# Patient Record
Sex: FEMALE | Race: WHITE | NOT HISPANIC OR LATINO | Employment: UNEMPLOYED | URBAN - NONMETROPOLITAN AREA
[De-identification: names, ages, dates, MRNs, and addresses within clinical notes are randomized per-mention and may not be internally consistent; named-entity substitution may affect disease eponyms.]

---

## 2017-01-24 ENCOUNTER — OFFICE VISIT (OUTPATIENT)
Dept: CARDIOLOGY | Facility: CLINIC | Age: 77
End: 2017-01-24

## 2017-01-24 VITALS
WEIGHT: 208.2 LBS | BODY MASS INDEX: 35.55 KG/M2 | DIASTOLIC BLOOD PRESSURE: 68 MMHG | OXYGEN SATURATION: 94 % | HEART RATE: 88 BPM | SYSTOLIC BLOOD PRESSURE: 144 MMHG | HEIGHT: 64 IN

## 2017-01-24 DIAGNOSIS — I35.0 NONRHEUMATIC AORTIC VALVE STENOSIS: ICD-10-CM

## 2017-01-24 DIAGNOSIS — I10 ESSENTIAL HYPERTENSION: ICD-10-CM

## 2017-01-24 DIAGNOSIS — I48.0 PAROXYSMAL A-FIB (HCC): Primary | ICD-10-CM

## 2017-01-24 DIAGNOSIS — Z79.01 CHRONIC ANTICOAGULATION: ICD-10-CM

## 2017-01-24 DIAGNOSIS — E78.2 MIXED HYPERLIPIDEMIA: ICD-10-CM

## 2017-01-24 DIAGNOSIS — I48.0 PAROXYSMAL ATRIAL FIBRILLATION (HCC): ICD-10-CM

## 2017-01-24 PROCEDURE — 93000 ELECTROCARDIOGRAM COMPLETE: CPT | Performed by: INTERNAL MEDICINE

## 2017-01-24 PROCEDURE — 99213 OFFICE O/P EST LOW 20 MIN: CPT | Performed by: INTERNAL MEDICINE

## 2017-01-24 RX ORDER — DOCUSATE SODIUM 100 MG/1
100 CAPSULE, LIQUID FILLED ORAL 2 TIMES DAILY PRN
COMMUNITY
End: 2017-04-11 | Stop reason: SDDI

## 2017-01-24 RX ORDER — ALBUTEROL SULFATE 90 UG/1
2 AEROSOL, METERED RESPIRATORY (INHALATION) EVERY 4 HOURS PRN
COMMUNITY

## 2017-01-24 NOTE — MR AVS SNAPSHOT
Zenia Olivares   1/24/2017 2:00 PM   Office Visit    Dept Phone:  [phone]   Encounter #:  [id number]    Provider:  Berta Leahy MD   Department:  [facility] CARDIOLOGY                Your Full Care Plan              Today's Medication Changes          These changes are accurate as of: 1/24/17  3:01 PM.  If you have any questions, ask your nurse or doctor.               Stop taking medication(s)listed here:     lisinopril 10 MG tablet   Commonly known as:  PRINIVIL,ZESTRIL   Stopped by:  Berta Leahy MD                      Your Updated Medication List          This list is accurate as of: 1/24/17  3:01 PM.  Always use your most recent med list.                albuterol 108 (90 BASE) MCG/ACT inhaler   Commonly known as:  PROVENTIL HFA;VENTOLIN HFA       apixaban 5 MG tablet tablet   Commonly known as:  ELIQUIS       baclofen 10 MG tablet   Commonly known as:  LIORESAL       busPIRone 5 MG tablet   Commonly known as:  BUSPAR       chlorthalidone 25 MG tablet   Commonly known as:  HYGROTON       cholecalciferol 1000 UNITS tablet   Commonly known as:  VITAMIN D3       CloNIDine 0.2 MG tablet   Commonly known as:  CATAPRES       ferrous sulfate 325 (65 FE) MG tablet       FLUTICASONE PROPIONATE NA       gabapentin 600 MG tablet   Commonly known as:  NEURONTIN       hydrALAZINE 100 MG tablet   Commonly known as:  APRESOLINE       insulin aspart 100 UNIT/ML injection   Commonly known as:  novoLOG       insulin detemir 100 UNIT/ML injection   Commonly known as:  LEVEMIR       levothyroxine 125 MCG tablet   Commonly known as:  SYNTHROID, LEVOTHROID       LINZESS 145 MCG capsule   Generic drug:  Linaclotide       loratadine 10 MG tablet   Commonly known as:  CLARITIN       montelukast 10 MG tablet   Commonly known as:  SINGULAIR       omeprazole 20 MG capsule   Commonly known as:  priLOSEC       rosuvastatin 10 MG tablet   Commonly known as:  CRESTOR       "STOOL SOFTENER 100 MG capsule   Generic drug:  docusate sodium       vitamin B-12 500 MCG tablet   Commonly known as:  CYANOCOBALAMIN               We Performed the Following     ECG 12 Lead       You Were Diagnosed With        Codes Comments    Paroxysmal a-fib    -  Primary ICD-10-CM: I48.0  ICD-9-CM: 427.31     Paroxysmal atrial fibrillation     ICD-10-CM: I48.0  ICD-9-CM: 427.31     Nonrheumatic aortic valve stenosis     ICD-10-CM: I35.0  ICD-9-CM: 424.1     Essential hypertension     ICD-10-CM: I10  ICD-9-CM: 401.9     Mixed hyperlipidemia     ICD-10-CM: E78.2  ICD-9-CM: 272.2     Chronic anticoagulation     ICD-10-CM: Z79.01  ICD-9-CM: V58.61       Instructions     None    Patient Instructions History      Upcoming Appointments     Visit Type Date Time Department    FOLLOW UP 1/24/2017  2:00 PM [facility] [place]    FOLLOW UP 4/11/2017  2:30 PM [facility] [place]      MyChart Signup     Our records indicate that you have declined [facility] [employer] signup. If you would like to sign up for [employer], please email [email or web address] or call [phone] to obtain an activation code.             Other Info from Your Visit           Your Appointments     Apr 11, 2017  2:30 PM EDT   Follow Up with Berta Leahy MD   [facility] CARDIOLOGY (--)    [address]  [place] [address] [phone]           Arrive 15 minutes prior to appointment.              Allergies     Ibuprofen      Sulfa Antibiotics        Reason for Visit     Atrial Fibrillation     Hypertension     Hyperlipidemia           Vital Signs     Blood Pressure Pulse Height Weight Oxygen Saturation Body Mass Index    144/68 (BP Location: Left arm, Patient Position: Sitting) 88 64\" (162.6 cm) 208 lb 3.2 oz (94.4 kg) 94% 35.74 kg/m2    Smoking Status                   Former Smoker           Problems and Diagnoses Noted     Long term use of blood thinners    High cholesterol or triglycerides    High blood " pressure    Aortic heart valve narrowing    Atrial fibrillation (irregular heartbeat)    Atrial fibrillation (irregular heartbeat)    -  Primary      No Longer an Issue     Kidney failure

## 2017-01-24 NOTE — PROGRESS NOTES
subjective     Chief Complaint   Patient presents with   • Atrial Fibrillation   • Hypertension   • Hyperlipidemia     History of Present Illness    Patient is here for cardiology follow-up for multiple cardiac issues.  Paroxysmal atrial fibrillation  Patient has paroxysmal atrial fibrillation she was admitted to [facility] May 25, 2016 where she was in atrial fibrillation and converted to sinus rhythm. Cardiac workup was negative stress test was negative for ischemia. LV ejection fraction is normal however patient had left ventricle hypertrophy, LV diastolic dysfunction and severe pulmonary hypertension. RV systolic pressure was 60.  Patient is maintained in sinus rhythm without any antiarrhythmic therapy.  In 2012 patient had a ASHLYN which showed mild left frontal hypertrophy and diastolic dysfunction trace mitral and tricuspid regurgitation.     Chronic anticoagulation  Chads VASC score is around 5 which is quite high  Patient is taking eliquis 5 mg twice a day. She has had no bleeding problems no side effects she is tolerating it very well     Hyperlipidemia  Patient is on Crestor 10 mg daily she is tolerating it very well. She states that she had lab work with Dr. Sena and lipids have been apparently acceptable.        HYPERTENSION  Zenia Olivares has long-standing essential hypertension for years. she is taking medications regularly. There are no medication side effects. Blood pressure is very well controlled. There has been no headache nausea chest pain. There has been no syncopal or presyncopal episode. she denies episodes of hypo-tension or accelerated hypertension.     Patient has renal failure however last creatinine is only 1.04 with BUN of 25. GFR is 52. Overall she is doing significantly better.     Patient also has hypothyroidism and diabetes mellitus.  Patient Active Problem List   Diagnosis   • Hypertension*   • Paroxysmal atrial fibrillation   • Asthma*   • Iron deficiency*   • Hypothyroidism*    • Diabetes mellitus*   • Arthritis*   • Chronic anticoagulation*   • Hyperlipidemia*   • Nonrheumatic aortic valve stenosis   • Pulmonary hypertension       Social History   Substance Use Topics   • Smoking status: Former Smoker     Quit date: 5/23/1966   • Smokeless tobacco: Never Used   • Alcohol use No       Allergies   Allergen Reactions   • Ibuprofen    • Sulfa Antibiotics          Current Outpatient Prescriptions:   •  albuterol (PROVENTIL HFA;VENTOLIN HFA) 108 (90 BASE) MCG/ACT inhaler, Inhale 2 puffs Every 4 (Four) Hours As Needed for wheezing., Disp: , Rfl:   •  apixaban (ELIQUIS) 5 MG tablet tablet, Take 5 mg by mouth 2 (two) times a day., Disp: , Rfl:   •  baclofen (LIORESAL) 10 MG tablet, Take 10 mg by mouth daily., Disp: , Rfl:   •  busPIRone (BUSPAR) 5 MG tablet, Take 5 mg by mouth 3 (three) times a day., Disp: , Rfl:   •  chlorthalidone (HYGROTEN) 25 MG tablet, Take 25 mg by mouth daily., Disp: , Rfl:   •  cholecalciferol (VITAMIN D3) 1000 UNITS tablet, Take 1,000 Units by mouth daily., Disp: , Rfl:   •  cloNIDine (CATAPRES) 0.2 MG tablet, Take 0.2 mg by mouth 3 (three) times a day as needed for high blood pressure., Disp: , Rfl:   •  docusate sodium (STOOL SOFTENER) 100 MG capsule, Take 100 mg by mouth 2 (Two) Times a Day As Needed for constipation., Disp: , Rfl:   •  ferrous sulfate 325 (65 FE) MG tablet, Take 325 mg by mouth daily with breakfast., Disp: , Rfl:   •  FLUTICASONE PROPIONATE NA, into each nostril., Disp: , Rfl:   •  gabapentin (NEURONTIN) 600 MG tablet, Take 600 mg by mouth 2 (Two) Times a Day., Disp: , Rfl:   •  hydrALAZINE (APRESOLINE) 100 MG tablet, Take 100 mg by mouth 3 (three) times a day. 1/2 tablet three times daily, Disp: , Rfl:   •  insulin aspart (NovoLOG) 100 UNIT/ML injection, Inject  under the skin 3 (three) times a day before meals. 4 units twice daily as needed, Disp: , Rfl:   •  insulin detemir (LEVEMIR) 100 UNIT/ML injection, Inject  under the skin daily. 20 units  "in the morning, 30 units in the evening., Disp: , Rfl:   •  levothyroxine (SYNTHROID, LEVOTHROID) 125 MCG tablet, Take 125 mcg by mouth daily., Disp: , Rfl:   •  Linaclotide (LINZESS) 145 MCG capsule, Take 145 capsules by mouth daily., Disp: , Rfl:   •  loratadine (CLARITIN) 10 MG tablet, Take 10 mg by mouth daily., Disp: , Rfl:   •  montelukast (SINGULAIR) 10 MG tablet, Take 10 mg by mouth every night., Disp: , Rfl:   •  omeprazole (PriLOSEC) 20 MG capsule, Take 20 mg by mouth 2 (two) times a day., Disp: , Rfl:   •  rosuvastatin (CRESTOR) 10 MG tablet, Take 10 mg by mouth daily., Disp: , Rfl:   •  vitamin B-12 (CYANOCOBALAMIN) 500 MCG tablet, Take 500 mcg by mouth daily., Disp: , Rfl:       The following portions of the patient's history were reviewed and updated as appropriate: allergies, current medications, past family history, past medical history, past social history, past surgical history and problem list.    Review of Systems   Constitution: Positive for weakness and malaise/fatigue. Negative for chills, decreased appetite and fever.   HENT: Negative.    Eyes: Negative.    Cardiovascular: Negative.  Negative for chest pain, dyspnea on exertion, irregular heartbeat, leg swelling, near-syncope, orthopnea and syncope.   Respiratory: Negative.    Hematologic/Lymphatic: Negative.    Skin: Negative.    Musculoskeletal: Negative.    Gastrointestinal: Negative.           Objective:     Visit Vitals   • /68 (BP Location: Left arm, Patient Position: Sitting)   • Pulse 88   • Ht 64\" (162.6 cm)   • Wt 208 lb 3.2 oz (94.4 kg)   • SpO2 94%   • BMI 35.74 kg/m2     Physical Exam   Constitutional: She appears well-developed and well-nourished.   HENT:   Head: Normocephalic and atraumatic.   Mouth/Throat: Oropharynx is clear and moist.   Eyes: Conjunctivae and EOM are normal. Pupils are equal, round, and reactive to light. No scleral icterus.   Neck: Normal range of motion. Neck supple. No JVD present. No tracheal " deviation present. No thyromegaly present.   Cardiovascular: Normal rate, regular rhythm, normal heart sounds and intact distal pulses.  Exam reveals no friction rub.    No murmur heard.  Pulmonary/Chest: Effort normal and breath sounds normal. No respiratory distress. She has no wheezes. She has no rales. She exhibits no tenderness.   Abdominal: Soft. Bowel sounds are normal. She exhibits no distension and no mass. There is no tenderness. There is no rebound and no guarding.   Musculoskeletal: Normal range of motion. She exhibits no edema, tenderness or deformity.   Lymphadenopathy:     She has no cervical adenopathy.   Neurological: She is alert. She has normal reflexes. No cranial nerve deficit. She exhibits normal muscle tone. Coordination normal.   Skin: Skin is warm and dry.   Psychiatric: She has a normal mood and affect. Her behavior is normal. Judgment and thought content normal.         Lab Review  Lab Results   Component Value Date     12/28/2016    K 4.3 12/28/2016     12/28/2016    BUN 16 12/28/2016    CREATININE 1.08 12/28/2016    GLUCOSE 150 (H) 12/28/2016    CALCIUM 9.7 12/28/2016    ALT 14 12/28/2016    ALKPHOS 96 12/28/2016    LABIL2 1.6 12/28/2016     Lab Results   Component Value Date    CKTOTAL 108 03/24/2016     Lab Results   Component Value Date    WBC 7.3 03/24/2016    HGB 12.8 03/24/2016    HCT 40.1 03/24/2016     03/24/2016     No results found for: INR  No results found for: MG  Lab Results   Component Value Date    TSH 0.612 03/30/2015     No results found for: BNP  Lab Results   Component Value Date    CHLPL 97 03/24/2016    LDLP 406 03/30/2015     Lab Results   Component Value Date    TRIG 73 03/24/2016    HDL 49 (L) 03/24/2016    VLDL 15 03/24/2016           ECG 12 Lead  Date/Time: 2/6/2017 9:46 AM  Performed by: ROLLY DE LA TORRE  Authorized by: ROLLY DE LA TORRE   Rhythm: sinus rhythm  Rate: normal  ST Segments: ST segments normal  QRS axis:  normal  Clinical impression: abnormal ECG  Comments: Sinus rhythm, nonspecific ST and T wave changes, 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 Paroxysmal a-fib    2. Paroxysmal atrial fibrillation currently in sinus rhythm*    3. Nonrheumatic aortic valve stenosis    4. Essential hypertension    5. Mixed hyperlipidemia    6. Chronic anticoagulation*          Plan:      Patient has paroxysmal atrial fibrillation currently appears to be in sinus rhythm and is doing very well.  Rate is controlled also.  As she is chronically anticoagulated with no side effects no bleeding.  Patient states that she had lab work done at her PCP will get a copy.  Weight loss was stressed.  No change in therapy was made.        Return in about 3 months (around 4/24/2017).

## 2017-01-24 NOTE — LETTER
January 28, 2017     Farhad Sena MD  [address]  [place]  [place] [address]    Patient: Zenia Olivares   YOB: 1940   Date of Visit: 1/24/2017       Dear Dr. Jaida MD:    Thank you for referring Zenia Olivares to me for evaluation. Below are the relevant portions of my assessment and plan of care.    If you have questions, please do not hesitate to call me. I look forward to following Zenia along with you.         Sincerely,        Berta Leahy MD        CC: No Recipients  Berta Leahy MD  1/28/2017  5:18 PM  Signed  subjective     Chief Complaint   Patient presents with   • Atrial Fibrillation   • Hypertension   • Hyperlipidemia     History of Present Illness    Patient is here for cardiology follow-up for multiple cardiac issues.  Paroxysmal atrial fibrillation  Patient has paroxysmal atrial fibrillation she was admitted to [facility] 25, 2016 where she was in atrial fibrillation and converted to sinus rhythm. Cardiac workup was negative stress test was negative for ischemia. LV ejection fraction is normal however patient had left ventricle hypertrophy, LV diastolic dysfunction and severe pulmonary hypertension. RV systolic pressure was 60.  Patient is maintained in sinus rhythm without any antiarrhythmic therapy.  In 2012 patient had a ASHLYN which showed mild left frontal hypertrophy and diastolic dysfunction trace mitral and tricuspid regurgitation.     Chronic anticoagulation  Chads VASC score is around 5 which is quite high  Patient is taking eliquis 5 mg twice a day. She has had no bleeding problems no side effects she is tolerating it very well     Hyperlipidemia  Patient is on Crestor 10 mg daily she is tolerating it very well. She states that she had lab work with Dr. Sena and lipids have been apparently acceptable.        HYPERTENSION  Zenia Olivares has long-standing essential hypertension for years. she is taking medications regularly. There are no  medication side effects. Blood pressure is very well controlled. There has been no headache nausea chest pain. There has been no syncopal or presyncopal episode. she denies episodes of hypo-tension or accelerated hypertension.     Patient has renal failure however last creatinine is only 1.04 with BUN of 25. GFR is 52. Overall she is doing significantly better.     Patient also has hypothyroidism and diabetes mellitus.  Patient Active Problem List   Diagnosis   • Hypertension*   • Paroxysmal atrial fibrillation   • Asthma*   • Iron deficiency*   • Hypothyroidism*   • Diabetes mellitus*   • Arthritis*   • Chronic anticoagulation*   • Hyperlipidemia*   • Nonrheumatic aortic valve stenosis   • Pulmonary hypertension       Social History   Substance Use Topics   • Smoking status: Former Smoker     Quit date: 5/23/1966   • Smokeless tobacco: Never Used   • Alcohol use No       Allergies   Allergen Reactions   • Ibuprofen    • Sulfa Antibiotics          Current Outpatient Prescriptions:   •  albuterol (PROVENTIL HFA;VENTOLIN HFA) 108 (90 BASE) MCG/ACT inhaler, Inhale 2 puffs Every 4 (Four) Hours As Needed for wheezing., Disp: , Rfl:   •  apixaban (ELIQUIS) 5 MG tablet tablet, Take 5 mg by mouth 2 (two) times a day., Disp: , Rfl:   •  baclofen (LIORESAL) 10 MG tablet, Take 10 mg by mouth daily., Disp: , Rfl:   •  busPIRone (BUSPAR) 5 MG tablet, Take 5 mg by mouth 3 (three) times a day., Disp: , Rfl:   •  chlorthalidone (HYGROTEN) 25 MG tablet, Take 25 mg by mouth daily., Disp: , Rfl:   •  cholecalciferol (VITAMIN D3) 1000 UNITS tablet, Take 1,000 Units by mouth daily., Disp: , Rfl:   •  cloNIDine (CATAPRES) 0.2 MG tablet, Take 0.2 mg by mouth 3 (three) times a day as needed for high blood pressure., Disp: , Rfl:   •  docusate sodium (STOOL SOFTENER) 100 MG capsule, Take 100 mg by mouth 2 (Two) Times a Day As Needed for constipation., Disp: , Rfl:   •  ferrous sulfate 325 (65 FE) MG tablet, Take 325 mg by mouth daily with  breakfast., Disp: , Rfl:   •  FLUTICASONE PROPIONATE NA, into each nostril., Disp: , Rfl:   •  gabapentin (NEURONTIN) 600 MG tablet, Take 600 mg by mouth 2 (Two) Times a Day., Disp: , Rfl:   •  hydrALAZINE (APRESOLINE) 100 MG tablet, Take 100 mg by mouth 3 (three) times a day. 1/2 tablet three times daily, Disp: , Rfl:   •  insulin aspart (NovoLOG) 100 UNIT/ML injection, Inject  under the skin 3 (three) times a day before meals. 4 units twice daily as needed, Disp: , Rfl:   •  insulin detemir (LEVEMIR) 100 UNIT/ML injection, Inject  under the skin daily. 20 units in the morning, 30 units in the evening., Disp: , Rfl:   •  levothyroxine (SYNTHROID, LEVOTHROID) 125 MCG tablet, Take 125 mcg by mouth daily., Disp: , Rfl:   •  Linaclotide (LINZESS) 145 MCG capsule, Take 145 capsules by mouth daily., Disp: , Rfl:   •  loratadine (CLARITIN) 10 MG tablet, Take 10 mg by mouth daily., Disp: , Rfl:   •  montelukast (SINGULAIR) 10 MG tablet, Take 10 mg by mouth every night., Disp: , Rfl:   •  omeprazole (PriLOSEC) 20 MG capsule, Take 20 mg by mouth 2 (two) times a day., Disp: , Rfl:   •  rosuvastatin (CRESTOR) 10 MG tablet, Take 10 mg by mouth daily., Disp: , Rfl:   •  vitamin B-12 (CYANOCOBALAMIN) 500 MCG tablet, Take 500 mcg by mouth daily., Disp: , Rfl:       The following portions of the patient's history were reviewed and updated as appropriate: allergies, current medications, past family history, past medical history, past social history, past surgical history and problem list.    Review of Systems   Constitution: Positive for weakness and malaise/fatigue. Negative for chills, decreased appetite and fever.   HENT: Negative.    Eyes: Negative.    Cardiovascular: Negative.  Negative for chest pain, dyspnea on exertion, irregular heartbeat, leg swelling, near-syncope, orthopnea and syncope.   Respiratory: Negative.    Hematologic/Lymphatic: Negative.    Skin: Negative.    Musculoskeletal: Negative.    Gastrointestinal:  "Negative.           Objective:     Visit Vitals   • /68 (BP Location: Left arm, Patient Position: Sitting)   • Pulse 88   • Ht 64\" (162.6 cm)   • Wt 208 lb 3.2 oz (94.4 kg)   • SpO2 94%   • BMI 35.74 kg/m2     Physical Exam   Constitutional: She appears well-developed and well-nourished.   HENT:   Head: Normocephalic and atraumatic.   Mouth/Throat: Oropharynx is clear and moist.   Eyes: Conjunctivae and EOM are normal. Pupils are equal, round, and reactive to light. No scleral icterus.   Neck: Normal range of motion. Neck supple. No JVD present. No tracheal deviation present. No thyromegaly present.   Cardiovascular: Normal rate, regular rhythm, normal heart sounds and intact distal pulses.  Exam reveals no friction rub.    No murmur heard.  Pulmonary/Chest: Effort normal and breath sounds normal. No respiratory distress. She has no wheezes. She has no rales. She exhibits no tenderness.   Abdominal: Soft. Bowel sounds are normal. She exhibits no distension and no mass. There is no tenderness. There is no rebound and no guarding.   Musculoskeletal: Normal range of motion. She exhibits no edema, tenderness or deformity.   Lymphadenopathy:     She has no cervical adenopathy.   Neurological: She is alert. She has normal reflexes. No cranial nerve deficit. She exhibits normal muscle tone. Coordination normal.   Skin: Skin is warm and dry.   Psychiatric: She has a normal mood and affect. Her behavior is normal. Judgment and thought content normal.         Lab Review  Lab Results   Component Value Date     12/28/2016    K 4.3 12/28/2016     12/28/2016    BUN 16 12/28/2016    CREATININE 1.08 12/28/2016    GLUCOSE 150 (H) 12/28/2016    CALCIUM 9.7 12/28/2016    ALT 14 12/28/2016    ALKPHOS 96 12/28/2016    LABIL2 1.6 12/28/2016     Lab Results   Component Value Date    CKTOTAL 108 03/24/2016     Lab Results   Component Value Date    WBC 7.3 03/24/2016    HGB 12.8 03/24/2016    HCT 40.1 03/24/2016     " 03/24/2016     No results found for: INR  No results found for: MG  Lab Results   Component Value Date    TSH 0.612 03/30/2015     No results found for: BNP  Lab Results   Component Value Date    CHLPL 97 03/24/2016    LDLP 406 03/30/2015     Lab Results   Component Value Date    TRIG 73 03/24/2016    HDL 49 (L) 03/24/2016    VLDL 15 03/24/2016         Procedures     I personally viewed and interpreted the patient's LAB data         Assessment:     1. Paroxysmal a-fib    2. Paroxysmal atrial fibrillation currently in sinus rhythm*    3. Nonrheumatic aortic valve stenosis    4. Essential hypertension    5. Mixed hyperlipidemia    6. Chronic anticoagulation*          Plan:      Patient has paroxysmal atrial fibrillation currently appears to be in sinus rhythm and is doing very well.  Rate is controlled also.  As she is chronically anticoagulated with no side effects no bleeding.  Patient states that she had lab work done at her PCP will get a copy.  Weight loss was stressed.  No change in therapy was made.        Return in about 3 months (around 4/24/2017).

## 2017-02-17 ENCOUNTER — TRANSCRIBE ORDERS (OUTPATIENT)
Dept: ADMINISTRATIVE | Facility: HOSPITAL | Age: 77
End: 2017-02-17

## 2017-02-17 ENCOUNTER — LAB (OUTPATIENT)
Dept: LAB | Facility: HOSPITAL | Age: 77
End: 2017-02-17

## 2017-02-17 DIAGNOSIS — E11.69 DIABETES MELLITUS ASSOCIATED WITH HORMONAL ETIOLOGY (HCC): ICD-10-CM

## 2017-02-17 DIAGNOSIS — E11.69 DIABETES MELLITUS ASSOCIATED WITH HORMONAL ETIOLOGY (HCC): Primary | ICD-10-CM

## 2017-02-17 DIAGNOSIS — E78.49 OTHER HYPERLIPIDEMIA: ICD-10-CM

## 2017-02-17 DIAGNOSIS — I10 ESSENTIAL HYPERTENSION, MALIGNANT: ICD-10-CM

## 2017-02-17 DIAGNOSIS — I48.91 ATRIAL FIBRILLATION, UNSPECIFIED TYPE (HCC): ICD-10-CM

## 2017-02-17 LAB
ALBUMIN SERPL-MCNC: 4.1 G/DL (ref 3.4–4.8)
ALBUMIN UR-MCNC: 108.7 MG/L
ALBUMIN/GLOB SERPL: 1.6 G/DL (ref 1.5–2.5)
ALP SERPL-CCNC: 105 U/L (ref 35–104)
ALT SERPL W P-5'-P-CCNC: 18 U/L (ref 10–36)
ANION GAP SERPL CALCULATED.3IONS-SCNC: 3.4 MMOL/L (ref 3.6–11.2)
AST SERPL-CCNC: 24 U/L (ref 10–30)
BILIRUB SERPL-MCNC: 0.3 MG/DL (ref 0.2–1.8)
BUN BLD-MCNC: 26 MG/DL (ref 7–21)
BUN/CREAT SERPL: 22.8 (ref 7–25)
CALCIUM SPEC-SCNC: 9.3 MG/DL (ref 7.7–10)
CHLORIDE SERPL-SCNC: 108 MMOL/L (ref 99–112)
CO2 SERPL-SCNC: 33.6 MMOL/L (ref 24.3–31.9)
CREAT BLD-MCNC: 1.14 MG/DL (ref 0.43–1.29)
CREAT UR-MCNC: 69.3 MG/DL
GFR SERPL CREATININE-BSD FRML MDRD: 46 ML/MIN/1.73
GLOBULIN UR ELPH-MCNC: 2.5 GM/DL
GLUCOSE BLD-MCNC: 118 MG/DL (ref 70–110)
HBA1C MFR BLD: 8 % (ref 4.5–5.7)
MICROALBUMIN/CREAT UR: 156.9 MG/G
OSMOLALITY SERPL CALC.SUM OF ELEC: 294.5 MOSM/KG (ref 273–305)
POTASSIUM BLD-SCNC: 4.3 MMOL/L (ref 3.5–5.3)
PROT SERPL-MCNC: 6.6 G/DL (ref 6–8)
SODIUM BLD-SCNC: 145 MMOL/L (ref 135–153)

## 2017-02-17 PROCEDURE — 82043 UR ALBUMIN QUANTITATIVE: CPT | Performed by: CLINIC/CENTER

## 2017-02-17 PROCEDURE — 83704 LIPOPROTEIN BLD QUAN PART: CPT | Performed by: CLINIC/CENTER

## 2017-02-17 PROCEDURE — 83036 HEMOGLOBIN GLYCOSYLATED A1C: CPT | Performed by: CLINIC/CENTER

## 2017-02-17 PROCEDURE — 82570 ASSAY OF URINE CREATININE: CPT | Performed by: CLINIC/CENTER

## 2017-02-17 PROCEDURE — 36415 COLL VENOUS BLD VENIPUNCTURE: CPT

## 2017-02-17 PROCEDURE — 80053 COMPREHEN METABOLIC PANEL: CPT | Performed by: CLINIC/CENTER

## 2017-02-17 PROCEDURE — 80061 LIPID PANEL: CPT | Performed by: CLINIC/CENTER

## 2017-02-18 LAB
CHOLEST SERPL-MCNC: 99 MG/DL (ref 100–199)
HDL SERPL-SCNC: 30.7 UMOL/L
HDLC SERPL-MCNC: 51 MG/DL
LDL-P: 541 NMOL/L
LDLC REAL SIZE PAT SERPL: 20.3 NM
LDLC SERPL CALC-MCNC: 27 MG/DL (ref 0–99)
SMALL LDL-P: 332 NMOL/L
TRIGL SERPL-MCNC: 104 MG/DL (ref 0–149)

## 2017-04-11 ENCOUNTER — OFFICE VISIT (OUTPATIENT)
Dept: CARDIOLOGY | Facility: CLINIC | Age: 77
End: 2017-04-11

## 2017-04-11 VITALS
BODY MASS INDEX: 35.51 KG/M2 | SYSTOLIC BLOOD PRESSURE: 132 MMHG | HEIGHT: 64 IN | DIASTOLIC BLOOD PRESSURE: 66 MMHG | OXYGEN SATURATION: 96 % | WEIGHT: 208 LBS | HEART RATE: 61 BPM

## 2017-04-11 DIAGNOSIS — I27.20 PULMONARY HYPERTENSION (HCC): ICD-10-CM

## 2017-04-11 DIAGNOSIS — I48.0 PAROXYSMAL ATRIAL FIBRILLATION (HCC): ICD-10-CM

## 2017-04-11 DIAGNOSIS — E78.2 MIXED HYPERLIPIDEMIA: Primary | ICD-10-CM

## 2017-04-11 DIAGNOSIS — Z79.01 CHRONIC ANTICOAGULATION: ICD-10-CM

## 2017-04-11 DIAGNOSIS — I10 ESSENTIAL HYPERTENSION: ICD-10-CM

## 2017-04-11 DIAGNOSIS — I35.0 NONRHEUMATIC AORTIC VALVE STENOSIS: ICD-10-CM

## 2017-04-11 PROCEDURE — 99213 OFFICE O/P EST LOW 20 MIN: CPT | Performed by: INTERNAL MEDICINE

## 2017-04-11 RX ORDER — UBIDECARENONE 100 MG
100 CAPSULE ORAL DAILY
COMMUNITY

## 2017-04-11 NOTE — PROGRESS NOTES
subjective     Chief Complaint   Patient presents with   • Follow-up   • Palpitations   • Hypertension     History of Present Illness     Patient is here for cardiology follow-up for multiple cardiac issues.  Paroxysmal atrial fibrillation  Patient has paroxysmal atrial fibrillation she was admitted to [facility] May 25, 2016 where she was in atrial fibrillation and converted to sinus rhythm. Cardiac workup was negative stress test was negative for ischemia. LV ejection fraction is normal however patient had left ventricle hypertrophy, LV diastolic dysfunction and severe pulmonary hypertension. RV systolic pressure was 60.    Patient is maintained in sinus rhythm without any antiarrhythmic therapy.  In 2012 patient had a ASHLYN which showed mild left ventricular hypertrophy and diastolic dysfunction, trace mitral and tricuspid regurgitation.      Chronic anticoagulation  Chads VASC score is around 5 which is quite high  Patient is taking eliquis 5 mg twice a day. She has had no bleeding problems no side effects she is tolerating it very well      Hyperlipidemia  Patient is on Crestor 10 mg daily she is tolerating it very well. She states that she had lab work with Dr. Sena and lipids have been apparently acceptable.          HYPERTENSION  Zenia Olivares has long-standing essential hypertension for years. she is taking medications regularly. There are no medication side effects. Blood pressure is very well controlled. There has been no headache nausea chest pain. There has been no syncopal or presyncopal episode. she denies episodes of hypo-tension or accelerated hypertension.      Patient has renal failure however last creatinine is only 1.04 with BUN of 25. GFR is 52. Overall she is doing significantly better.      Patient also has hypothyroidism and diabetes mellitus    Patient Active Problem List   Diagnosis   • Hypertension*   • Paroxysmal atrial fibrillation   • Asthma*   • Iron deficiency*   •  Hypothyroidism*   • Diabetes mellitus*   • Arthritis*   • Chronic anticoagulation*   • Hyperlipidemia*   • Nonrheumatic aortic valve stenosis   • Pulmonary hypertension       Social History   Substance Use Topics   • Smoking status: Former Smoker     Quit date: 5/23/1966   • Smokeless tobacco: Never Used   • Alcohol use No       Allergies   Allergen Reactions   • Ibuprofen    • Sulfa Antibiotics          Current Outpatient Prescriptions:   •  albuterol (PROVENTIL HFA;VENTOLIN HFA) 108 (90 BASE) MCG/ACT inhaler, Inhale 2 puffs Every 4 (Four) Hours As Needed for wheezing., Disp: , Rfl:   •  apixaban (ELIQUIS) 5 MG tablet tablet, Take 5 mg by mouth 2 (two) times a day., Disp: , Rfl:   •  baclofen (LIORESAL) 10 MG tablet, Take 10 mg by mouth daily., Disp: , Rfl:   •  busPIRone (BUSPAR) 5 MG tablet, Take 5 mg by mouth 3 (three) times a day., Disp: , Rfl:   •  calcium citrate-vitamin d (CITRACAL) 200-250 MG-UNIT tablet tablet, Take  by mouth Daily., Disp: , Rfl:   •  chlorthalidone (HYGROTEN) 25 MG tablet, Take 25 mg by mouth daily., Disp: , Rfl:   •  cholecalciferol (VITAMIN D3) 1000 UNITS tablet, Take 1,000 Units by mouth daily., Disp: , Rfl:   •  cloNIDine (CATAPRES) 0.2 MG tablet, Take 0.2 mg by mouth 3 (three) times a day as needed for high blood pressure., Disp: , Rfl:   •  coenzyme Q10 100 MG capsule, Take 100 mg by mouth Daily., Disp: , Rfl:   •  ferrous sulfate 325 (65 FE) MG tablet, Take 325 mg by mouth daily with breakfast., Disp: , Rfl:   •  gabapentin (NEURONTIN) 600 MG tablet, Take 600 mg by mouth 2 (Two) Times a Day., Disp: , Rfl:   •  hydrALAZINE (APRESOLINE) 100 MG tablet, Take 100 mg by mouth 3 (three) times a day. 1/2 tablet three times daily, Disp: , Rfl:   •  insulin aspart (NovoLOG) 100 UNIT/ML injection, Inject  under the skin 3 (three) times a day before meals. 4 units twice daily as needed, Disp: , Rfl:   •  insulin detemir (LEVEMIR) 100 UNIT/ML injection, Inject  under the skin daily. 20 units in  "the morning, 30 units in the evening., Disp: , Rfl:   •  levothyroxine (SYNTHROID, LEVOTHROID) 125 MCG tablet, Take 125 mcg by mouth daily., Disp: , Rfl:   •  Linaclotide (LINZESS) 145 MCG capsule, Take 145 capsules by mouth daily., Disp: , Rfl:   •  loratadine (CLARITIN) 10 MG tablet, Take 10 mg by mouth daily., Disp: , Rfl:   •  montelukast (SINGULAIR) 10 MG tablet, Take 10 mg by mouth every night., Disp: , Rfl:   •  omeprazole (PriLOSEC) 20 MG capsule, Take 20 mg by mouth 2 (two) times a day., Disp: , Rfl:   •  rosuvastatin (CRESTOR) 10 MG tablet, Take 10 mg by mouth daily., Disp: , Rfl:   •  vitamin B-12 (CYANOCOBALAMIN) 500 MCG tablet, Take 500 mcg by mouth daily., Disp: , Rfl:   •  FLUTICASONE PROPIONATE NA, into each nostril., Disp: , Rfl:       The following portions of the patient's history were reviewed and updated as appropriate: allergies, current medications, past family history, past medical history, past social history, past surgical history and problem list.    Review of Systems   Constitution: Negative.   HENT: Negative.    Eyes: Negative.    Cardiovascular: Negative.    Respiratory: Negative.    Hematologic/Lymphatic: Negative.    Musculoskeletal: Negative.    Gastrointestinal: Negative.    Neurological: Negative.           Objective:     /66 (BP Location: Left arm, Patient Position: Sitting)  Pulse 61  Ht 64\" (162.6 cm)  Wt 208 lb (94.3 kg)  SpO2 96%  BMI 35.7 kg/m2  Physical Exam   Constitutional: She appears well-developed and well-nourished.   HENT:   Head: Normocephalic and atraumatic.   Mouth/Throat: Oropharynx is clear and moist.   Eyes: Conjunctivae and EOM are normal. Pupils are equal, round, and reactive to light. No scleral icterus.   Neck: Normal range of motion. Neck supple. No JVD present. No tracheal deviation present. No thyromegaly present.   Cardiovascular: Normal rate, regular rhythm, normal heart sounds and intact distal pulses.  Exam reveals no friction rub.    No " murmur heard.  Pulmonary/Chest: Effort normal and breath sounds normal. No respiratory distress. She has no wheezes. She has no rales. She exhibits no tenderness.   Abdominal: Soft. Bowel sounds are normal. She exhibits no distension and no mass. There is no tenderness. There is no rebound and no guarding.   Musculoskeletal: Normal range of motion. She exhibits no edema, tenderness or deformity.   Lymphadenopathy:     She has no cervical adenopathy.   Neurological: She is alert. She has normal reflexes. No cranial nerve deficit. She exhibits normal muscle tone. Coordination normal.   Skin: Skin is warm and dry.   Psychiatric: She has a normal mood and affect. Her behavior is normal. Judgment and thought content normal.         Lab Review  Lab Results   Component Value Date     02/17/2017    K 4.3 02/17/2017     02/17/2017    BUN 26 (H) 02/17/2017    CREATININE 1.14 02/17/2017    GLUCOSE 118 (H) 02/17/2017    CALCIUM 9.3 02/17/2017    ALT 18 02/17/2017    ALKPHOS 105 (H) 02/17/2017    LABIL2 1.6 02/17/2017     Lab Results   Component Value Date    CKTOTAL 108 03/24/2016     Lab Results   Component Value Date    WBC 7.3 03/24/2016    HGB 12.8 03/24/2016    HCT 40.1 03/24/2016     03/24/2016     No results found for: INR  No results found for: MG  Lab Results   Component Value Date    TSH 0.612 03/30/2015     No results found for: BNP  Lab Results   Component Value Date    CHLPL 99 (L) 02/17/2017    LDLP 541 02/17/2017     Lab Results   Component Value Date    TRIG 104 02/17/2017    HDL 49 (L) 03/24/2016    VLDL 15 03/24/2016         Procedures     I personally viewed and interpreted the patient's LAB data         Assessment:     1. Mixed hyperlipidemia    2. Essential hypertension    3. Nonrheumatic aortic valve stenosis    4. Paroxysmal atrial fibrillation    5. Pulmonary hypertension    6. Chronic anticoagulation*          Plan:      Patient seems to be doing very well.  Patient in sinus  rhythm  Blood pressure is controlled  Lipids are also very well controlled    Patient will continue current medications per Dr. Sena.  No change in therapy.      ow-up on file.

## 2017-07-27 ENCOUNTER — OFFICE VISIT (OUTPATIENT)
Dept: CARDIOLOGY | Facility: CLINIC | Age: 77
End: 2017-07-27

## 2017-07-27 VITALS
HEIGHT: 65 IN | HEART RATE: 84 BPM | SYSTOLIC BLOOD PRESSURE: 122 MMHG | WEIGHT: 203 LBS | OXYGEN SATURATION: 95 % | DIASTOLIC BLOOD PRESSURE: 70 MMHG | BODY MASS INDEX: 33.82 KG/M2

## 2017-07-27 DIAGNOSIS — I35.0 NONRHEUMATIC AORTIC VALVE STENOSIS: ICD-10-CM

## 2017-07-27 DIAGNOSIS — E78.2 MIXED HYPERLIPIDEMIA: ICD-10-CM

## 2017-07-27 DIAGNOSIS — I10 ESSENTIAL HYPERTENSION: ICD-10-CM

## 2017-07-27 DIAGNOSIS — Z79.01 CHRONIC ANTICOAGULATION: ICD-10-CM

## 2017-07-27 DIAGNOSIS — I27.20 PULMONARY HYPERTENSION (HCC): ICD-10-CM

## 2017-07-27 DIAGNOSIS — I48.0 PAROXYSMAL ATRIAL FIBRILLATION (HCC): Primary | ICD-10-CM

## 2017-07-27 PROCEDURE — 93000 ELECTROCARDIOGRAM COMPLETE: CPT | Performed by: INTERNAL MEDICINE

## 2017-07-27 PROCEDURE — 99213 OFFICE O/P EST LOW 20 MIN: CPT | Performed by: INTERNAL MEDICINE

## 2017-07-27 NOTE — PROGRESS NOTES
subjective     Chief Complaint   Patient presents with   • Hyperlipidemia   • paroxysmal atrial fibrillation   • Hypertension     History of Present Illness  Patient is here for cardiology follow-up for multiple cardiac issues.  Paroxysmal atrial fibrillation  Patient has paroxysmal atrial fibrillation she was admitted to [facility] May 25, 2016 where she was in atrial fibrillation and converted to sinus rhythm. Cardiac workup was negative stress test was negative for ischemia. LV ejection fraction is normal however patient had left ventricle hypertrophy, LV diastolic dysfunction and severe pulmonary hypertension. RV systolic pressure was 60.     Patient is maintained in sinus rhythm without any antiarrhythmic therapy.  In 2012 patient had a ASHLYN which showed mild left ventricular hypertrophy and diastolic dysfunction, trace mitral and tricuspid regurgitation.  Patient sees be doing very well denies any chest pain palpitations.  She does complain of being tired fatigue and short of breath.  She also complains of some aching underneath the left shoulder blade.      Chronic anticoagulation  Chads VASC score is around 5 which is quite high  Patient is taking eliquis 5 mg twice a day. She has had no bleeding problems no side effects she is tolerating it very well      Hyperlipidemia  Patient is on Crestor 10 mg daily she is tolerating it very well. She states that she had lab work with Dr. Sena and lipids have been apparently acceptable.          HYPERTENSION  Zenia Olivares has long-standing essential hypertension for years. she is taking medications regularly. There are no medication side effects. Blood pressure is very well controlled. There has been no headache nausea chest pain. There has been no syncopal or presyncopal episode. she denies episodes of hypo-tension or accelerated hypertension.    Other medical problems consist of obesity, hypothyroidism, diabetes mellitus and mild renal failure estimated GFR  52        Past Surgical History:   Procedure Laterality Date   • APPENDECTOMY     • CARDIAC CATHETERIZATION     • CARDIOVASCULAR STRESS TEST  2012   •  SECTION     • CHOLECYSTECTOMY     • ECHO - CONVERTED  2015   • HYSTERECTOMY       Family History   Problem Relation Age of Onset   • Leukemia Mother    • Hyperlipidemia Father    • Heart attack Father    • Heart attack Brother    • Cancer Brother      Lung / Bladder     Past Medical History:   Diagnosis Date   • Arthritis    • Asthma    • Bradycardia    • Chronic anticoagulation    • Diabetes mellitus     Type 2   • H/O multiple allergies    • Hyperlipidemia    • Hypertension    • Hypertensive heart disease    • Hypothyroidism    • Iron deficiency    • Paroxysmal atrial fibrillation    • Shingles      Patient Active Problem List   Diagnosis   • Hypertension*   • Paroxysmal atrial fibrillation   • Asthma*   • Iron deficiency*   • Hypothyroidism*   • Diabetes mellitus*   • Arthritis*   • Chronic anticoagulation*   • Hyperlipidemia*   • Nonrheumatic aortic valve stenosis   • Pulmonary hypertension       Social History   Substance Use Topics   • Smoking status: Former Smoker     Quit date: 1966   • Smokeless tobacco: Never Used   • Alcohol use No       Allergies   Allergen Reactions   • Ibuprofen    • Sulfa Antibiotics        Current Outpatient Prescriptions on File Prior to Visit   Medication Sig   • albuterol (PROVENTIL HFA;VENTOLIN HFA) 108 (90 BASE) MCG/ACT inhaler Inhale 2 puffs Every 4 (Four) Hours As Needed for wheezing.   • apixaban (ELIQUIS) 5 MG tablet tablet Take 5 mg by mouth 2 (two) times a day.   • baclofen (LIORESAL) 10 MG tablet Take 10 mg by mouth daily.   • busPIRone (BUSPAR) 5 MG tablet Take 5 mg by mouth 3 (three) times a day.   • calcium citrate-vitamin d (CITRACAL) 200-250 MG-UNIT tablet tablet Take  by mouth Daily.   • chlorthalidone (HYGROTEN) 25 MG tablet Take 25 mg by mouth daily.   • cloNIDine (CATAPRES) 0.2 MG tablet  Take 0.2 mg by mouth 3 (three) times a day as needed for high blood pressure.   • coenzyme Q10 100 MG capsule Take 100 mg by mouth Daily.   • ferrous sulfate 325 (65 FE) MG tablet Take 325 mg by mouth daily with breakfast.   • FLUTICASONE PROPIONATE NA into each nostril.   • gabapentin (NEURONTIN) 600 MG tablet Take 600 mg by mouth 3 (Three) Times a Day.   • hydrALAZINE (APRESOLINE) 100 MG tablet Take 100 mg by mouth 3 (three) times a day. 1/2 tablet three times daily   • insulin aspart (NovoLOG) 100 UNIT/ML injection Inject  under the skin 3 (Three) Times a Day Before Meals. Sliding scale   • insulin detemir (LEVEMIR) 100 UNIT/ML injection Inject  under the skin Daily. 25 units in the morning, 20 units in the evening.   • levothyroxine (SYNTHROID, LEVOTHROID) 125 MCG tablet Take 125 mcg by mouth daily.   • Linaclotide (LINZESS) 145 MCG capsule Take 145 capsules by mouth daily.   • loratadine (CLARITIN) 10 MG tablet Take 10 mg by mouth daily.   • montelukast (SINGULAIR) 10 MG tablet Take 10 mg by mouth every night.   • omeprazole (PriLOSEC) 20 MG capsule Take 20 mg by mouth 2 (two) times a day.   • rosuvastatin (CRESTOR) 10 MG tablet Take 10 mg by mouth daily.   • vitamin B-12 (CYANOCOBALAMIN) 500 MCG tablet Take 500 mcg by mouth daily.   • cholecalciferol (VITAMIN D3) 1000 UNITS tablet Take 1,000 Units by mouth daily.     No current facility-administered medications on file prior to visit.          The following portions of the patient's history were reviewed and updated as appropriate: allergies, current medications, past family history, past medical history, past social history, past surgical history and problem list.    Review of Systems   Constitution: Negative.   HENT: Negative.  Negative for congestion and headaches.    Eyes: Negative.    Cardiovascular: Negative.  Negative for chest pain, cyanosis, dyspnea on exertion, irregular heartbeat, leg swelling, near-syncope, orthopnea, palpitations, paroxysmal  "nocturnal dyspnea and syncope.   Respiratory: Negative.  Negative for shortness of breath.    Hematologic/Lymphatic: Negative.    Musculoskeletal: Positive for arthritis, back pain and stiffness.   Gastrointestinal: Negative.    Neurological: Negative.           Objective:     /70 (BP Location: Left arm, Patient Position: Sitting, Cuff Size: Adult)  Pulse 84  Ht 65\" (165.1 cm)  Wt 203 lb (92.1 kg)  SpO2 95%  BMI 33.78 kg/m2  Physical Exam   Constitutional: She appears well-developed and well-nourished.   Obesity   HENT:   Head: Normocephalic and atraumatic.   Mouth/Throat: Oropharynx is clear and moist.   Eyes: Conjunctivae and EOM are normal. Pupils are equal, round, and reactive to light. No scleral icterus.   Neck: Normal range of motion. Neck supple. No JVD present. No tracheal deviation present. No thyromegaly present.   Cardiovascular: Normal rate, regular rhythm, normal heart sounds and intact distal pulses.  Exam reveals no friction rub.    No murmur heard.  Pulmonary/Chest: Effort normal and breath sounds normal. No respiratory distress. She has no wheezes. She has no rales. She exhibits no tenderness.   Abdominal: Soft. Bowel sounds are normal. She exhibits no distension and no mass. There is no tenderness. There is no rebound and no guarding.   Musculoskeletal: Normal range of motion. She exhibits no edema, tenderness or deformity.   Lymphadenopathy:     She has no cervical adenopathy.   Neurological: She is alert. She has normal reflexes. No cranial nerve deficit. She exhibits normal muscle tone. Coordination normal.   Skin: Skin is warm and dry.   Psychiatric: She has a normal mood and affect. Her behavior is normal. Judgment and thought content normal.         Lab Review  No labs available  Pt following closely with dr Sena      ECG 12 Lead  Date/Time: 7/27/2017 4:47 PM  Performed by: ROLLY DE LA TORRE  Authorized by: ROLLY DE LA TORRE   Rhythm: sinus rhythm  Rate: normal  QRS " axis: left  Other findings: LAE  Clinical impression: abnormal ECG  Comments: T-wave inversion in lead 1 and aVL and V5 and V6.  T-wave changes are slightly more pronounced compared to old EKG               I personally viewed and interpreted the patient's LAB data         Assessment:     1. Paroxysmal atrial fibrillation    2. Mixed hyperlipidemia    3. Essential hypertension    4. Nonrheumatic aortic valve stenosis    5. Pulmonary hypertension    6. Chronic anticoagulation*          Plan:      Patient has paroxysmal atrial fibrillation she is currently in sinus rhythm and doing very well without antiarrhythmic therapy.  EKG today shows sinus rhythm rate of 69/m.    Anterolateral wall T-wave changes noted.  Consider ischemia however patient is asymptomatic.  We will continue to follow.  Patient is anticoagulated with eliquis.  She denies any side effects.    Blood pressure is also very well controlled.  Weight loss was stressed.  No change in therapy  She will follow closely with Dr. moore.        Return in about 4 months (around 11/27/2017).

## 2017-11-20 ENCOUNTER — OFFICE VISIT (OUTPATIENT)
Dept: CARDIOLOGY | Facility: CLINIC | Age: 77
End: 2017-11-20

## 2017-11-20 VITALS
WEIGHT: 203.8 LBS | HEIGHT: 65 IN | BODY MASS INDEX: 33.95 KG/M2 | HEART RATE: 66 BPM | DIASTOLIC BLOOD PRESSURE: 62 MMHG | OXYGEN SATURATION: 96 % | SYSTOLIC BLOOD PRESSURE: 132 MMHG

## 2017-11-20 DIAGNOSIS — I10 ESSENTIAL HYPERTENSION: ICD-10-CM

## 2017-11-20 DIAGNOSIS — Z79.01 CHRONIC ANTICOAGULATION: ICD-10-CM

## 2017-11-20 DIAGNOSIS — I27.20 PULMONARY HYPERTENSION (HCC): ICD-10-CM

## 2017-11-20 DIAGNOSIS — I48.0 PAROXYSMAL ATRIAL FIBRILLATION (HCC): Primary | ICD-10-CM

## 2017-11-20 DIAGNOSIS — E78.2 MIXED HYPERLIPIDEMIA: ICD-10-CM

## 2017-11-20 PROCEDURE — 99213 OFFICE O/P EST LOW 20 MIN: CPT | Performed by: INTERNAL MEDICINE

## 2017-11-20 NOTE — PROGRESS NOTES
subjective     Chief Complaint   Patient presents with   • Follow-up   • Hypertension   • Hyperlipidemia     History of Present Illness  Patient is 77 years old white female who has multiple chronic medical problems.  She is here for cardiology follow-up.  She has paroxysmal atrial fibrillation she has been in sinus rhythm lately.  Patient has severe pulmonary hypertension and LV diastolic dysfunction.  She denies any chest pain or palpitation but gets short of breath easily.  Her chads VASC score is quite high around 5.  She is taking eliquis 5 twice a day and is not having any problems.  Patient also has hypertension and hyperlipidemia which seems been very well controlled under care of Dr. moore.:  Consists of diabetes mellitus obesity hypothyroidism and renal failure.  Overall she seems to be doing very well.    Patient complains of epigastric discomfort and some nausea hip pain and low back pain and difficulty walking.  She plans to discuss those issues with Dr. Moore on her next visit.    Past Surgical History:   Procedure Laterality Date   • APPENDECTOMY     • CARDIAC CATHETERIZATION     • CARDIOVASCULAR STRESS TEST  2012   •  SECTION     • CHOLECYSTECTOMY     • ECHO - CONVERTED  2015   • HYSTERECTOMY       Family History   Problem Relation Age of Onset   • Leukemia Mother    • Hyperlipidemia Father    • Heart attack Father    • Heart attack Brother    • Cancer Brother      Lung / Bladder     Past Medical History:   Diagnosis Date   • Arthritis    • Asthma    • Bradycardia    • Chronic anticoagulation    • Diabetes mellitus     Type 2   • H/O multiple allergies    • Hyperlipidemia    • Hypertension    • Hypertensive heart disease    • Hypothyroidism    • Iron deficiency    • Paroxysmal atrial fibrillation    • Shingles      Patient Active Problem List   Diagnosis   • Hypertension*   • Paroxysmal atrial fibrillation   • Asthma*   • Iron deficiency*   • Hypothyroidism*   • Diabetes mellitus*    • Arthritis*   • Chronic anticoagulation*   • Hyperlipidemia*   • Nonrheumatic aortic valve stenosis   • Pulmonary hypertension       Social History   Substance Use Topics   • Smoking status: Former Smoker     Quit date: 5/23/1966   • Smokeless tobacco: Never Used   • Alcohol use No       Allergies   Allergen Reactions   • Ibuprofen    • Sulfa Antibiotics        Current Outpatient Prescriptions on File Prior to Visit   Medication Sig   • albuterol (PROVENTIL HFA;VENTOLIN HFA) 108 (90 BASE) MCG/ACT inhaler Inhale 2 puffs Every 4 (Four) Hours As Needed for wheezing.   • apixaban (ELIQUIS) 5 MG tablet tablet Take 5 mg by mouth 2 (two) times a day.   • baclofen (LIORESAL) 10 MG tablet Take 10 mg by mouth daily.   • busPIRone (BUSPAR) 5 MG tablet Take 5 mg by mouth 3 (three) times a day.   • calcium citrate-vitamin d (CITRACAL) 200-250 MG-UNIT tablet tablet Take  by mouth Daily.   • chlorthalidone (HYGROTEN) 25 MG tablet Take 25 mg by mouth daily.   • cloNIDine (CATAPRES) 0.2 MG tablet Take 0.2 mg by mouth 3 (three) times a day as needed for high blood pressure.   • ferrous sulfate 325 (65 FE) MG tablet Take 325 mg by mouth daily with breakfast.   • FLUTICASONE PROPIONATE NA into each nostril.   • gabapentin (NEURONTIN) 600 MG tablet Take 600 mg by mouth 3 (Three) Times a Day.   • hydrALAZINE (APRESOLINE) 100 MG tablet Take 100 mg by mouth 3 (three) times a day. 1/2 tablet three times daily   • insulin aspart (NovoLOG) 100 UNIT/ML injection Inject  under the skin 3 (Three) Times a Day Before Meals. Sliding scale   • insulin detemir (LEVEMIR) 100 UNIT/ML injection Inject  under the skin Daily. 25 units in the morning, 20 units in the evening.   • levothyroxine (SYNTHROID, LEVOTHROID) 125 MCG tablet Take 125 mcg by mouth daily.   • Linaclotide (LINZESS) 145 MCG capsule Take 145 capsules by mouth daily.   • loratadine (CLARITIN) 10 MG tablet Take 10 mg by mouth daily.   • montelukast (SINGULAIR) 10 MG tablet Take 10 mg by  "mouth every night.   • omeprazole (PriLOSEC) 20 MG capsule Take 20 mg by mouth 2 (two) times a day.   • rosuvastatin (CRESTOR) 10 MG tablet Take 10 mg by mouth daily.   • vitamin B-12 (CYANOCOBALAMIN) 500 MCG tablet Take 500 mcg by mouth daily.   • cholecalciferol (VITAMIN D3) 1000 UNITS tablet Take 1,000 Units by mouth daily.   • coenzyme Q10 100 MG capsule Take 100 mg by mouth Daily.     No current facility-administered medications on file prior to visit.          The following portions of the patient's history were reviewed and updated as appropriate: allergies, current medications, past family history, past medical history, past social history, past surgical history and problem list.    Review of Systems   Constitution: Negative.   HENT: Negative.  Negative for congestion.    Eyes: Negative.    Cardiovascular: Negative.  Negative for chest pain, cyanosis, dyspnea on exertion, irregular heartbeat, leg swelling, near-syncope, orthopnea, palpitations, paroxysmal nocturnal dyspnea and syncope.   Respiratory: Negative.  Negative for shortness of breath.    Hematologic/Lymphatic: Negative.    Musculoskeletal: Positive for arthritis and back pain.   Gastrointestinal: Positive for heartburn.   Neurological: Negative.  Negative for headaches.          Objective:     /62 (BP Location: Left arm, Patient Position: Sitting)  Pulse 66  Ht 65\" (165.1 cm)  Wt 203 lb 12.8 oz (92.4 kg)  SpO2 96%  BMI 33.91 kg/m2  Physical Exam   Constitutional: She appears well-developed and well-nourished. No distress.   HENT:   Head: Normocephalic and atraumatic.   Mouth/Throat: Oropharynx is clear and moist. No oropharyngeal exudate.   Eyes: Conjunctivae and EOM are normal. Pupils are equal, round, and reactive to light. No scleral icterus.   Neck: Normal range of motion. Neck supple. No JVD present. No tracheal deviation present. No thyromegaly present.   Cardiovascular: Normal rate, regular rhythm, normal heart sounds and intact " distal pulses.  PMI is not displaced.  Exam reveals no gallop, no friction rub and no decreased pulses.    No murmur heard.  Pulses:       Carotid pulses are 3+ on the right side, and 3+ on the left side.       Radial pulses are 3+ on the right side, and 3+ on the left side.   Pulmonary/Chest: Effort normal and breath sounds normal. No respiratory distress. She has no wheezes. She has no rales. She exhibits no tenderness.   Abdominal: Soft. Bowel sounds are normal. She exhibits no distension, no abdominal bruit and no mass. There is no splenomegaly or hepatomegaly. There is no tenderness. There is no rebound and no guarding.   Musculoskeletal: Normal range of motion. She exhibits no edema, tenderness or deformity.   Lymphadenopathy:     She has no cervical adenopathy.   Neurological: She is alert. She has normal reflexes. No cranial nerve deficit. She exhibits normal muscle tone. Coordination normal.   Skin: Skin is warm and dry. No rash noted. She is not diaphoretic. No erythema.   Psychiatric: She has a normal mood and affect. Her behavior is normal. Judgment and thought content normal.         Lab Review  Lab Results   Component Value Date     02/17/2017    K 4.3 02/17/2017     02/17/2017    BUN 26 (H) 02/17/2017    CREATININE 1.14 02/17/2017    GLUCOSE 118 (H) 02/17/2017    CALCIUM 9.3 02/17/2017    ALT 18 02/17/2017    ALKPHOS 105 (H) 02/17/2017    LABIL2 1.6 02/17/2017     Lab Results   Component Value Date    CKTOTAL 108 03/24/2016     Lab Results   Component Value Date    WBC 7.3 03/24/2016    HGB 12.8 03/24/2016    HCT 40.1 03/24/2016     03/24/2016     No results found for: INR  No results found for: MG  Lab Results   Component Value Date    TSH 0.612 03/30/2015     No results found for: BNP  Lab Results   Component Value Date    CHLPL 99 (L) 02/17/2017    TRIG 104 02/17/2017    HDL 49 (L) 03/24/2016    VLDL 15 03/24/2016         Procedures       I personally viewed and interpreted the  patient's LAB data         Assessment:     1. Paroxysmal atrial fibrillation    2. Mixed hyperlipidemia    3. Essential hypertension    4. Pulmonary hypertension    5. Chronic anticoagulation*          Plan:      Impression a 77 years old white female with history of paroxysmal atrial fibrillation.  She is currently in sinus rhythm and doing very well.  She has been anticoagulated with eliquis without any side effects.  Patient has multiple vague noncardiac complaints including arthritis hip pain and epigastric discomfort she will discuss with Dr. moore during her next visit.  From cardiac standpoint she seems to be doing very well.  No change in therapy was made she was advised to follow closely with Dr. moore.  Follow-up scheduled no change in therapy needed.        Return in about 3 months (around 2/20/2018).

## 2018-02-12 ENCOUNTER — OFFICE VISIT (OUTPATIENT)
Dept: CARDIOLOGY | Facility: CLINIC | Age: 78
End: 2018-02-12

## 2018-02-12 VITALS
DIASTOLIC BLOOD PRESSURE: 64 MMHG | HEIGHT: 62 IN | BODY MASS INDEX: 37.98 KG/M2 | SYSTOLIC BLOOD PRESSURE: 122 MMHG | HEART RATE: 87 BPM | WEIGHT: 206.4 LBS | OXYGEN SATURATION: 97 %

## 2018-02-12 DIAGNOSIS — I48.0 PAROXYSMAL ATRIAL FIBRILLATION (HCC): ICD-10-CM

## 2018-02-12 DIAGNOSIS — I35.0 NONRHEUMATIC AORTIC VALVE STENOSIS: Primary | ICD-10-CM

## 2018-02-12 DIAGNOSIS — I27.20 PULMONARY HYPERTENSION (HCC): ICD-10-CM

## 2018-02-12 DIAGNOSIS — Z79.01 CHRONIC ANTICOAGULATION: ICD-10-CM

## 2018-02-12 PROCEDURE — 99214 OFFICE O/P EST MOD 30 MIN: CPT | Performed by: INTERNAL MEDICINE

## 2018-02-12 RX ORDER — POTASSIUM CHLORIDE 750 MG/1
TABLET, EXTENDED RELEASE ORAL
Refills: 1 | COMMUNITY
Start: 2018-02-07

## 2018-02-12 RX ORDER — FUROSEMIDE 20 MG/1
TABLET ORAL
Refills: 1 | COMMUNITY
Start: 2018-02-07

## 2018-02-12 NOTE — PROGRESS NOTES
subjective     Chief Complaint   Patient presents with   • Follow-up   • Atrial Fibrillation   • Hypertension     History of Present Illness  Patient is 78 years old white female who has history of paroxysmal atrial fibrillation.  She is anticoagulated with eliquis without any side effects.  Heart rate is very well controlled.  Patient has no syncope or presyncope overall she is asymptomatic.  She is maintaining sinus rhythm.  Chads VASC score is quite high around 5.    Patient has multiple chronic medical problems and is following closely with Dr. Sena.  She has history of hypertension, hyperlipidemia, diabetes mellitus and hypothyroidism.  Overall she seems to be doing very well.    Patient recently on 2018 had an echocardiogram done at [facility].  Which showed normal LV ejection fraction of 55-60%, mild left 22 hypertrophy with LV diastolic dysfunction moderate calcific aortic stenosis with aortic valve area around 1.2 peak gradient of 32 and mean gradient of 7.  Pulmonary hypertension of 57.  There is no significant change from last echo.    Past Surgical History:   Procedure Laterality Date   • APPENDECTOMY     • CARDIAC CATHETERIZATION     • CARDIOVASCULAR STRESS TEST  2012   •  SECTION     • CHOLECYSTECTOMY     • ECHO - CONVERTED  2015   • HYSTERECTOMY       Family History   Problem Relation Age of Onset   • Leukemia Mother    • Hyperlipidemia Father    • Heart attack Father    • Heart attack Brother    • Cancer Brother      Lung / Bladder     Past Medical History:   Diagnosis Date   • Arthritis    • Asthma    • Bradycardia    • Chronic anticoagulation    • Diabetes mellitus     Type 2   • H/O multiple allergies    • Hyperlipidemia    • Hypertension    • Hypertensive heart disease    • Hypothyroidism    • Iron deficiency    • Paroxysmal atrial fibrillation    • Shingles      Patient Active Problem List   Diagnosis   • Hypertension*   • Paroxysmal atrial fibrillation   •  Asthma*   • Iron deficiency*   • Hypothyroidism*   • Diabetes mellitus*   • Arthritis*   • Chronic anticoagulation*   • Hyperlipidemia*   • Nonrheumatic aortic valve stenosis   • Pulmonary hypertension       Social History   Substance Use Topics   • Smoking status: Former Smoker     Quit date: 5/23/1966   • Smokeless tobacco: Never Used   • Alcohol use No       Allergies   Allergen Reactions   • Ibuprofen Other (See Comments)     Patient states that it knots her stomach    • Sulfa Antibiotics Hives       Current Outpatient Prescriptions on File Prior to Visit   Medication Sig   • albuterol (PROVENTIL HFA;VENTOLIN HFA) 108 (90 BASE) MCG/ACT inhaler Inhale 2 puffs Every 4 (Four) Hours As Needed for wheezing.   • apixaban (ELIQUIS) 5 MG tablet tablet Take 5 mg by mouth 2 (two) times a day.   • baclofen (LIORESAL) 10 MG tablet Take 10 mg by mouth daily.   • busPIRone (BUSPAR) 5 MG tablet Take 5 mg by mouth 3 (three) times a day.   • calcium citrate-vitamin d (CITRACAL) 200-250 MG-UNIT tablet tablet Take  by mouth Daily.   • chlorthalidone (HYGROTEN) 25 MG tablet Take 25 mg by mouth daily.   • cholecalciferol (VITAMIN D3) 1000 UNITS tablet Take 1,000 Units by mouth daily.   • cloNIDine (CATAPRES) 0.2 MG tablet Take 0.2 mg by mouth 3 (three) times a day as needed for high blood pressure.   • coenzyme Q10 100 MG capsule Take 100 mg by mouth Daily.   • ferrous sulfate 325 (65 FE) MG tablet Take 325 mg by mouth daily with breakfast.   • FLUTICASONE PROPIONATE NA into each nostril.   • gabapentin (NEURONTIN) 600 MG tablet Take 600 mg by mouth 3 (Three) Times a Day.   • hydrALAZINE (APRESOLINE) 100 MG tablet Take 100 mg by mouth 3 (three) times a day. 1/2 tablet three times daily   • insulin aspart (NovoLOG) 100 UNIT/ML injection Inject  under the skin 3 (Three) Times a Day Before Meals. Sliding scale   • insulin detemir (LEVEMIR) 100 UNIT/ML injection Inject  under the skin Daily. 25 units in the morning, 20 units in the  "evening.   • levothyroxine (SYNTHROID, LEVOTHROID) 125 MCG tablet Take 125 mcg by mouth daily.   • Linaclotide (LINZESS) 145 MCG capsule Take 145 capsules by mouth daily.   • loratadine (CLARITIN) 10 MG tablet Take 10 mg by mouth daily.   • montelukast (SINGULAIR) 10 MG tablet Take 10 mg by mouth every night.   • omeprazole (PriLOSEC) 20 MG capsule Take 20 mg by mouth 2 (two) times a day.   • rosuvastatin (CRESTOR) 10 MG tablet Take 10 mg by mouth daily.   • vitamin B-12 (CYANOCOBALAMIN) 500 MCG tablet Take 500 mcg by mouth daily.     No current facility-administered medications on file prior to visit.          The following portions of the patient's history were reviewed and updated as appropriate: allergies, current medications, past family history, past medical history, past social history, past surgical history and problem list.    Review of Systems   Constitution: Negative.   HENT: Negative.  Negative for congestion.    Eyes: Negative.    Cardiovascular: Negative.  Negative for chest pain, cyanosis, dyspnea on exertion, irregular heartbeat, leg swelling, near-syncope, orthopnea, palpitations, paroxysmal nocturnal dyspnea and syncope.   Respiratory: Negative.  Negative for shortness of breath.    Hematologic/Lymphatic: Negative.    Musculoskeletal: Negative.    Gastrointestinal: Negative.    Neurological: Negative.  Negative for headaches.          Objective:     /64 (BP Location: Left arm, Patient Position: Sitting, Cuff Size: Large Adult)  Pulse 87  Ht 157.5 cm (62\")  Wt 93.6 kg (206 lb 6.4 oz)  SpO2 97%  BMI 37.75 kg/m2  Physical Exam   Constitutional: She appears well-developed and well-nourished. No distress.   HENT:   Head: Normocephalic and atraumatic.   Mouth/Throat: Oropharynx is clear and moist. No oropharyngeal exudate.   Eyes: Conjunctivae and EOM are normal. Pupils are equal, round, and reactive to light. No scleral icterus.   Neck: Normal range of motion. Neck supple. No JVD present. No " tracheal deviation present. No thyromegaly present.   Cardiovascular: Normal rate, regular rhythm and intact distal pulses.  PMI is not displaced.  Exam reveals no gallop, no friction rub and no decreased pulses.    Murmur heard.  Pulses:       Carotid pulses are 3+ on the right side, and 3+ on the left side.       Radial pulses are 3+ on the right side, and 3+ on the left side.   Pulmonary/Chest: Effort normal and breath sounds normal. No respiratory distress. She has no wheezes. She has no rales. She exhibits no tenderness.   Abdominal: Soft. Bowel sounds are normal. She exhibits no distension, no abdominal bruit and no mass. There is no splenomegaly or hepatomegaly. There is no tenderness. There is no rebound and no guarding.   Musculoskeletal: Normal range of motion. She exhibits no edema, tenderness or deformity.   Lymphadenopathy:     She has no cervical adenopathy.   Neurological: She is alert. She has normal reflexes. No cranial nerve deficit. She exhibits normal muscle tone. Coordination normal.   Skin: Skin is warm and dry. No rash noted. She is not diaphoretic. No erythema.   Psychiatric: She has a normal mood and affect. Her behavior is normal. Judgment and thought content normal.         Lab Review      Procedures           Assessment:     1. Nonrheumatic aortic valve stenosis    2. Paroxysmal atrial fibrillation    3. Pulmonary hypertension    4. Chronic anticoagulation*          Plan:      Patient has moderate calcific aortic stenosis which is nonrheumatic.  He had an echocardiogram done in January 2018 which does not show any significant change.  Patient is asymptomatic and is doing very well.  We will monitor echo yearly.    Patient has paroxysmal atrial fibrillation currently in sinus rhythm and anticoagulated with eliquis without any side effects that will be continued.    She has pulmonary hypertension which is also unchanged.  Patient has multiple chronic medical problems including hypertension,  hyperlipidemia, diabetes mellitus, hypothyroidism.  Will get a copy of lab work from Dr. Sena.    No change in therapy        No Follow-up on file.

## 2018-05-14 ENCOUNTER — OFFICE VISIT (OUTPATIENT)
Dept: CARDIOLOGY | Facility: CLINIC | Age: 78
End: 2018-05-14

## 2018-05-14 DIAGNOSIS — I35.0 NONRHEUMATIC AORTIC VALVE STENOSIS: ICD-10-CM

## 2018-05-14 DIAGNOSIS — Z79.01 CHRONIC ANTICOAGULATION: ICD-10-CM

## 2018-05-14 DIAGNOSIS — E78.2 MIXED HYPERLIPIDEMIA: ICD-10-CM

## 2018-05-14 DIAGNOSIS — I10 ESSENTIAL HYPERTENSION: ICD-10-CM

## 2018-05-14 DIAGNOSIS — I48.0 PAROXYSMAL ATRIAL FIBRILLATION (HCC): Primary | ICD-10-CM

## 2018-05-14 PROCEDURE — 99213 OFFICE O/P EST LOW 20 MIN: CPT | Performed by: INTERNAL MEDICINE

## 2018-05-14 NOTE — PROGRESS NOTES
subjective     Chief Complaint   Patient presents with   • Hypertension   • Atrial Fibrillation   • Follow-up     History of Present Illness    Patient is here for cardiology follow-up.  She is 78 years old white female with history of paroxysmal atrial fibrillation.  Patient is currently in sinus rhythm.   Chads VASC score is around 5 (sex, age, hypertension, diabetes).     She is anticoagulated with eliquis.  She has no drug side effects.  Patient also has moderate calcific aortic stenosis with normal LV cavity size and ejection fraction normal last echo [facility] in [place] 2018.    Patient also has hypertension, hyperlipidemia, diabetes mellitus, hypothyroidism and obesity.  Patient is following closely with Dr. Sena.    Past Surgical History:   Procedure Laterality Date   • APPENDECTOMY     • CARDIAC CATHETERIZATION     • CARDIOVASCULAR STRESS TEST  2012   •  SECTION     • CHOLECYSTECTOMY     • ECHO - CONVERTED  2015   • HYSTERECTOMY       Family History   Problem Relation Age of Onset   • Leukemia Mother    • Hyperlipidemia Father    • Heart attack Father    • Heart attack Brother    • Cancer Brother         Lung / Bladder     Past Medical History:   Diagnosis Date   • Arthritis    • Asthma    • Bradycardia    • Chronic anticoagulation    • Diabetes mellitus     Type 2   • H/O multiple allergies    • Hyperlipidemia    • Hypertension    • Hypertensive heart disease    • Hypothyroidism    • Iron deficiency    • Paroxysmal atrial fibrillation    • Shingles      Patient Active Problem List   Diagnosis   • Hypertension*   • Paroxysmal atrial fibrillation   • Asthma*   • Iron deficiency*   • Hypothyroidism*   • Diabetes mellitus*   • Arthritis*   • Chronic anticoagulation*   • Hyperlipidemia*   • Nonrheumatic aortic valve stenosis   • Pulmonary hypertension       Social History   Substance Use Topics   • Smoking status: Former Smoker     Quit date: 1966   • Smokeless  tobacco: Never Used   • Alcohol use No       Allergies   Allergen Reactions   • Ibuprofen Other (See Comments)     Patient states that it knots her stomach    • Sulfa Antibiotics Hives       Current Outpatient Prescriptions on File Prior to Visit   Medication Sig   • albuterol (PROVENTIL HFA;VENTOLIN HFA) 108 (90 BASE) MCG/ACT inhaler Inhale 2 puffs Every 4 (Four) Hours As Needed for wheezing.   • apixaban (ELIQUIS) 5 MG tablet tablet Take 5 mg by mouth 2 (two) times a day.   • baclofen (LIORESAL) 10 MG tablet Take 10 mg by mouth daily.   • busPIRone (BUSPAR) 5 MG tablet Take 5 mg by mouth 3 (three) times a day.   • calcium citrate-vitamin d (CITRACAL) 200-250 MG-UNIT tablet tablet Take  by mouth Daily.   • chlorthalidone (HYGROTEN) 25 MG tablet Take 25 mg by mouth daily.   • cloNIDine (CATAPRES) 0.2 MG tablet Take 0.2 mg by mouth 3 (three) times a day as needed for high blood pressure.   • coenzyme Q10 100 MG capsule Take 100 mg by mouth Daily.   • FLUTICASONE PROPIONATE NA into each nostril.   • furosemide (LASIX) 20 MG tablet TK 1 T PO QD PRN   • hydrALAZINE (APRESOLINE) 100 MG tablet Take 100 mg by mouth 3 (three) times a day. 1/2 tablet three times daily   • insulin aspart (NovoLOG) 100 UNIT/ML injection Inject  under the skin 3 (Three) Times a Day Before Meals. Sliding scale   • insulin detemir (LEVEMIR) 100 UNIT/ML injection Inject  under the skin Daily. 25 units in the morning, 20 units in the evening.   • levothyroxine (SYNTHROID, LEVOTHROID) 125 MCG tablet Take 125 mcg by mouth daily.   • Linaclotide (LINZESS) 145 MCG capsule Take 145 capsules by mouth daily.   • loratadine (CLARITIN) 10 MG tablet Take 10 mg by mouth daily.   • montelukast (SINGULAIR) 10 MG tablet Take 10 mg by mouth every night.   • omeprazole (PriLOSEC) 20 MG capsule Take 20 mg by mouth 2 (two) times a day.   • potassium chloride (K-DUR,KLOR-CON) 10 MEQ CR tablet TK 1 T PO QD PRN   • rosuvastatin (CRESTOR) 10 MG tablet Take 10 mg by  "mouth daily.   • vitamin B-12 (CYANOCOBALAMIN) 500 MCG tablet Take 500 mcg by mouth daily.     No current facility-administered medications on file prior to visit.          The following portions of the patient's history were reviewed and updated as appropriate: allergies, current medications, past family history, past medical history, past social history, past surgical history and problem list.    Review of Systems   Constitution: Negative.   HENT: Negative.  Negative for congestion.    Eyes: Negative.    Cardiovascular: Negative.  Negative for chest pain, cyanosis, dyspnea on exertion, irregular heartbeat, leg swelling, near-syncope, orthopnea, palpitations, paroxysmal nocturnal dyspnea and syncope.   Respiratory: Positive for shortness of breath.    Hematologic/Lymphatic: Negative.    Musculoskeletal: Negative.    Gastrointestinal: Negative.    Neurological: Negative.  Negative for headaches.          Objective:     /64   Pulse 80   Ht 157.5 cm (62\")   Wt 88.9 kg (196 lb)   SpO2 94%   BMI 35.85 kg/m²   Physical Exam   Constitutional: She appears well-developed and well-nourished. No distress.   HENT:   Head: Normocephalic and atraumatic.   Mouth/Throat: Oropharynx is clear and moist. No oropharyngeal exudate.   Eyes: Conjunctivae and EOM are normal. Pupils are equal, round, and reactive to light. No scleral icterus.   Neck: Normal range of motion. Neck supple. No JVD present. No tracheal deviation present. No thyromegaly present.   Cardiovascular: Normal rate, regular rhythm and intact distal pulses.  PMI is not displaced.  Exam reveals no gallop, no friction rub and no decreased pulses.    Murmur heard.  Pulses:       Carotid pulses are 3+ on the right side, and 3+ on the left side.       Radial pulses are 3+ on the right side, and 3+ on the left side.   Pulmonary/Chest: Effort normal and breath sounds normal. No respiratory distress. She has no wheezes. She has no rales. She exhibits no tenderness. "   Abdominal: Soft. Bowel sounds are normal. She exhibits no distension, no abdominal bruit and no mass. There is no splenomegaly or hepatomegaly. There is no tenderness. There is no rebound and no guarding.   Musculoskeletal: Normal range of motion. She exhibits no edema, tenderness or deformity.   Lymphadenopathy:     She has no cervical adenopathy.   Neurological: She is alert. She has normal reflexes. No cranial nerve deficit. She exhibits normal muscle tone. Coordination normal.   Skin: Skin is warm and dry. No rash noted. She is not diaphoretic. No erythema.   Psychiatric: She has a normal mood and affect. Her behavior is normal. Judgment and thought content normal.         Lab Review    Procedures       I personally viewed and interpreted the patient's LAB data         Assessment:     1. Paroxysmal atrial fibrillation    2. Mixed hyperlipidemia    3. Essential hypertension    4. Chronic anticoagulation*    5. Nonrheumatic aortic valve stenosis          Plan:      Patient seen be doing very well.  Blood pressure is around 140 systolic.  She checks her blood pressure at home and has been good.  Patient was advised to lose weight.  Currently patient appears to be in sinus rhythm and is anticoagulated with eliquis.    She has abnormal EKG showing old septal MI however a stress test at [facility] in [place] 2016 shows normal perfusion with normal ejection fraction negative for ischemia.    Currently no change in therapy.  Healthy lifestyle exercise.  Follow-up scheduled    Return in about 3 months (around 8/14/2018).

## 2018-05-20 VITALS
OXYGEN SATURATION: 94 % | DIASTOLIC BLOOD PRESSURE: 64 MMHG | SYSTOLIC BLOOD PRESSURE: 140 MMHG | HEIGHT: 62 IN | WEIGHT: 196 LBS | HEART RATE: 80 BPM | BODY MASS INDEX: 36.07 KG/M2

## 2018-07-11 ENCOUNTER — OFFICE VISIT (OUTPATIENT)
Dept: CARDIOLOGY | Facility: CLINIC | Age: 78
End: 2018-07-11

## 2018-07-11 VITALS
BODY MASS INDEX: 36.62 KG/M2 | WEIGHT: 199 LBS | HEART RATE: 92 BPM | SYSTOLIC BLOOD PRESSURE: 138 MMHG | DIASTOLIC BLOOD PRESSURE: 70 MMHG | OXYGEN SATURATION: 94 % | HEIGHT: 62 IN

## 2018-07-11 DIAGNOSIS — R00.2 PALPITATIONS: ICD-10-CM

## 2018-07-11 DIAGNOSIS — I35.0 NONRHEUMATIC AORTIC VALVE STENOSIS: ICD-10-CM

## 2018-07-11 DIAGNOSIS — E66.9 OBESITY, UNSPECIFIED CLASSIFICATION, UNSPECIFIED OBESITY TYPE, UNSPECIFIED WHETHER SERIOUS COMORBIDITY PRESENT: ICD-10-CM

## 2018-07-11 DIAGNOSIS — I48.0 PAROXYSMAL ATRIAL FIBRILLATION (HCC): Primary | ICD-10-CM

## 2018-07-11 PROCEDURE — 93000 ELECTROCARDIOGRAM COMPLETE: CPT | Performed by: INTERNAL MEDICINE

## 2018-07-11 PROCEDURE — 99213 OFFICE O/P EST LOW 20 MIN: CPT | Performed by: INTERNAL MEDICINE

## 2018-07-11 RX ORDER — POLYETHYLENE GLYCOL 3350 17 G/17G
17 POWDER, FOR SOLUTION ORAL DAILY
COMMUNITY

## 2018-07-11 NOTE — PATIENT INSTRUCTIONS

## 2018-07-11 NOTE — PROGRESS NOTES
subjective     Chief Complaint   Patient presents with   • Hypertension   • Atrial Fibrillation   • Follow-up     History of Present Illness  Patient is 78 years old white female who has history of paroxysmal atrial fibrillation.  She has chads VASC score around 5 (sex, age, hypertension, diabetes).    She is anticoagulated with eliquis.    She states that recently she had to be admitted to [facility] where she had some cardiac workup done.  She was seen by 2 cardiologists including EP.  No medications were changed.  She then saw Dr. Sena twice.  Unfortunately she states that she still does not know if they found anything.    She is doing very well is completely asymptomatic at this time.  She still taking the same medications.    Patient also has hypertension, hyperlipidemia, diabetes mellitus, hypothyroidism and obesity.  She is following closely with Dr. Sena.    Past Surgical History:   Procedure Laterality Date   • APPENDECTOMY     • CARDIAC CATHETERIZATION     • CARDIOVASCULAR STRESS TEST  2012   •  SECTION     • CHOLECYSTECTOMY     • ECHO - CONVERTED  2015   • HYSTERECTOMY       Family History   Problem Relation Age of Onset   • Leukemia Mother    • Hyperlipidemia Father    • Heart attack Father    • Heart attack Brother    • Cancer Brother         Lung / Bladder     Past Medical History:   Diagnosis Date   • Arthritis    • Asthma    • Bradycardia    • Chronic anticoagulation    • Diabetes mellitus (CMS/HCC)     Type 2   • H/O multiple allergies    • Hyperlipidemia    • Hypertension    • Hypertensive heart disease    • Hypothyroidism    • Iron deficiency    • Paroxysmal atrial fibrillation (CMS/HCC)    • Shingles      Patient Active Problem List   Diagnosis   • Hypertension*   • Paroxysmal atrial fibrillation (CMS/HCC)   • Asthma*   • Iron deficiency*   • Hypothyroidism*   • Diabetes mellitus*   • Arthritis*   • Chronic anticoagulation*   • Hyperlipidemia*   • Nonrheumatic aortic  valve stenosis   • Pulmonary hypertension       Social History   Substance Use Topics   • Smoking status: Former Smoker     Quit date: 5/23/1966   • Smokeless tobacco: Never Used   • Alcohol use No       Allergies   Allergen Reactions   • Ibuprofen Other (See Comments)     Patient states that it knots her stomach    • Sulfa Antibiotics Hives       Current Outpatient Prescriptions on File Prior to Visit   Medication Sig   • albuterol (PROVENTIL HFA;VENTOLIN HFA) 108 (90 BASE) MCG/ACT inhaler Inhale 2 puffs Every 4 (Four) Hours As Needed for wheezing.   • apixaban (ELIQUIS) 5 MG tablet tablet Take 5 mg by mouth 2 (two) times a day.   • baclofen (LIORESAL) 10 MG tablet Take 10 mg by mouth daily.   • busPIRone (BUSPAR) 5 MG tablet Take 5 mg by mouth 3 (three) times a day.   • calcium citrate-vitamin d (CITRACAL) 200-250 MG-UNIT tablet tablet Take  by mouth Daily.   • chlorthalidone (HYGROTEN) 25 MG tablet Take 25 mg by mouth daily.   • cloNIDine (CATAPRES) 0.2 MG tablet Take 0.2 mg by mouth 3 (three) times a day as needed for high blood pressure.   • coenzyme Q10 100 MG capsule Take 100 mg by mouth Daily.   • FLUTICASONE PROPIONATE NA into each nostril.   • furosemide (LASIX) 20 MG tablet TK 1 T PO QD PRN   • hydrALAZINE (APRESOLINE) 100 MG tablet Take 100 mg by mouth 3 (three) times a day. 1/2 tablet three times daily   • insulin aspart (NovoLOG) 100 UNIT/ML injection Inject  under the skin 3 (Three) Times a Day Before Meals. Sliding scale   • insulin detemir (LEVEMIR) 100 UNIT/ML injection Inject  under the skin Daily. 25 units in the morning, 20 units in the evening.   • levothyroxine (SYNTHROID, LEVOTHROID) 125 MCG tablet Take 125 mcg by mouth daily.   • Linaclotide (LINZESS) 145 MCG capsule Take 145 capsules by mouth daily.   • loratadine (CLARITIN) 10 MG tablet Take 10 mg by mouth daily.   • montelukast (SINGULAIR) 10 MG tablet Take 10 mg by mouth every night.   • omeprazole (PriLOSEC) 20 MG capsule Take 20 mg by  "mouth 2 (two) times a day.   • potassium chloride (K-DUR,KLOR-CON) 10 MEQ CR tablet TK 1 T PO QD PRN   • rosuvastatin (CRESTOR) 10 MG tablet Take 10 mg by mouth daily.   • vitamin B-12 (CYANOCOBALAMIN) 500 MCG tablet Take 500 mcg by mouth daily.     No current facility-administered medications on file prior to visit.          The following portions of the patient's history were reviewed and updated as appropriate: allergies, current medications, past family history, past medical history, past social history, past surgical history and problem list.    Review of Systems   Constitution: Positive for weakness and malaise/fatigue.   HENT: Negative.  Negative for congestion.    Eyes: Negative.    Cardiovascular: Negative.  Negative for chest pain, cyanosis, dyspnea on exertion, irregular heartbeat, leg swelling, near-syncope, orthopnea, palpitations, paroxysmal nocturnal dyspnea and syncope.   Respiratory: Negative.  Negative for shortness of breath.    Hematologic/Lymphatic: Negative.    Musculoskeletal: Negative.    Gastrointestinal: Negative.    Neurological: Negative for headaches.          Objective:     /70 (BP Location: Left arm, Patient Position: Sitting)   Pulse 92   Ht 157.5 cm (62\")   Wt 90.3 kg (199 lb)   SpO2 94%   BMI 36.40 kg/m²   Physical Exam   Constitutional: She appears well-developed and well-nourished. No distress.   HENT:   Head: Normocephalic and atraumatic.   Mouth/Throat: Oropharynx is clear and moist. No oropharyngeal exudate.   Eyes: Conjunctivae and EOM are normal. Pupils are equal, round, and reactive to light. No scleral icterus.   Neck: Normal range of motion. Neck supple. No JVD present. No tracheal deviation present. No thyromegaly present.   Cardiovascular: Normal rate, regular rhythm, normal heart sounds and intact distal pulses.  PMI is not displaced.  Exam reveals no gallop, no friction rub and no decreased pulses.    No murmur heard.  Pulses:       Carotid pulses are 3+ on " the right side, and 3+ on the left side.       Radial pulses are 3+ on the right side, and 3+ on the left side.   Pulmonary/Chest: Effort normal and breath sounds normal. No respiratory distress. She has no wheezes. She has no rales. She exhibits no tenderness.   Abdominal: Soft. Bowel sounds are normal. She exhibits no distension, no abdominal bruit and no mass. There is no splenomegaly or hepatomegaly. There is no tenderness. There is no rebound and no guarding.   Musculoskeletal: Normal range of motion. She exhibits no edema, tenderness or deformity.   Lymphadenopathy:     She has no cervical adenopathy.   Neurological: She is alert. She has normal reflexes. No cranial nerve deficit. She exhibits normal muscle tone. Coordination normal.   Skin: Skin is warm and dry. No rash noted. She is not diaphoretic. No erythema.   Psychiatric: She has a normal mood and affect. Her behavior is normal. Judgment and thought content normal.         Lab Review      ECG 12 Lead  Date/Time: 7/11/2018 3:23 PM  Performed by: ROLLY DE LA TORRE  Authorized by: ROLLY DE LA TORRE   Comparison: compared with previous ECG from 7/27/2017  Similar to previous ECG  Rhythm: sinus rhythm  Rate: normal  BPM: 78  Conduction: conduction normal  QRS axis: normal  Other findings: LAE  Clinical impression: myocardial infarction  Comments: Old septal myocardial infarction, nonspecific ST and T wave changes               I personally viewed and interpreted the patient's LAB data         Assessment:     1. Paroxysmal atrial fibrillation (CMS/HCC)    2. Obesity, unspecified classification, unspecified obesity type, unspecified whether serious comorbidity present    3. Palpitations    4. Nonrheumatic aortic valve stenosis          Plan:      [facility] in [place] was called.  Reports are not available as yet.  Hospital will fax the reports as soon as they can.  Patient at this time is stable and asymptomatic.  EKG does not show any acute  changes.  She is in sinus rhythm rate is controlled.    Blood pressure is also very well controlled  Aggressive risk factor modification and weight loss was emphasized.  Follow-up scheduled    No Follow-up on file.

## 2018-10-09 ENCOUNTER — OFFICE VISIT (OUTPATIENT)
Dept: CARDIOLOGY | Facility: CLINIC | Age: 78
End: 2018-10-09

## 2018-10-09 VITALS
OXYGEN SATURATION: 98 % | WEIGHT: 197 LBS | BODY MASS INDEX: 36.25 KG/M2 | DIASTOLIC BLOOD PRESSURE: 60 MMHG | HEIGHT: 62 IN | SYSTOLIC BLOOD PRESSURE: 110 MMHG | HEART RATE: 72 BPM

## 2018-10-09 DIAGNOSIS — I10 ESSENTIAL HYPERTENSION: ICD-10-CM

## 2018-10-09 DIAGNOSIS — Z79.01 CHRONIC ANTICOAGULATION: ICD-10-CM

## 2018-10-09 DIAGNOSIS — R00.2 PALPITATIONS: ICD-10-CM

## 2018-10-09 DIAGNOSIS — R53.82 CHRONIC FATIGUE: Primary | ICD-10-CM

## 2018-10-09 DIAGNOSIS — I48.0 PAROXYSMAL ATRIAL FIBRILLATION (HCC): ICD-10-CM

## 2018-10-09 PROCEDURE — 93000 ELECTROCARDIOGRAM COMPLETE: CPT | Performed by: INTERNAL MEDICINE

## 2018-10-09 PROCEDURE — 99214 OFFICE O/P EST MOD 30 MIN: CPT | Performed by: INTERNAL MEDICINE

## 2018-10-09 RX ORDER — POTASSIUM CHLORIDE 750 MG/1
TABLET, FILM COATED, EXTENDED RELEASE ORAL
Refills: 0 | COMMUNITY
Start: 2018-09-20 | End: 2018-10-09 | Stop reason: SDUPTHER

## 2018-10-09 RX ORDER — DOFETILIDE 0.25 MG/1
CAPSULE ORAL
Refills: 5 | COMMUNITY
Start: 2018-09-25

## 2018-10-09 RX ORDER — INSULIN DETEMIR 100 [IU]/ML
INJECTION, SOLUTION SUBCUTANEOUS
Refills: 2 | COMMUNITY
Start: 2018-09-12

## 2018-10-09 RX ORDER — PEN NEEDLE, DIABETIC 32GX 5/32"
NEEDLE, DISPOSABLE MISCELLANEOUS
Refills: 3 | COMMUNITY
Start: 2018-09-19

## 2018-10-09 RX ORDER — BLOOD SUGAR DIAGNOSTIC
STRIP MISCELLANEOUS
Refills: 5 | COMMUNITY
Start: 2018-09-21

## 2018-10-09 RX ORDER — GABAPENTIN 600 MG/1
TABLET ORAL
Refills: 0 | COMMUNITY
Start: 2018-09-06

## 2018-10-09 RX ORDER — NITROGLYCERIN 0.4 MG/1
TABLET SUBLINGUAL
Refills: 2 | COMMUNITY
Start: 2018-07-29

## 2018-10-09 RX ORDER — LIDOCAINE 50 MG/G
PATCH TOPICAL
Refills: 1 | COMMUNITY
Start: 2018-08-29

## 2018-10-09 NOTE — PROGRESS NOTES
subjective     Chief Complaint   Patient presents with   • Paroxysmal atrial fibrillation     Follow up   • Nonrheumatic aortic valve stenosis     Follow up     History of Present Illness  Zenia is 78 years old white female who has history of paroxysmal atrial fibrillation.  She states that recently she had an episode of atrial fibrillation with rapid ventricular rate.  She was admitted to [facility].  She is taking Tikosyn 250 twice a day and is anticoagulated with eliquis 5 mg twice a day.  Patient has history of hypertension.  She is taking clonidine 0.2 3 times a day hydralazine 100 3 times a day, Lopressor 25 twice a day, Lasix 20 mg daily and Hygroton 25 mg daily  She states her blood pressure is running quite low she is weak and tired fatigue and cannot hardly walk.  She feels like her knees ago no give out while walking just from the parking lot.    Past Surgical History:   Procedure Laterality Date   • APPENDECTOMY     • CARDIAC CATHETERIZATION     • CARDIOVASCULAR STRESS TEST  2012   •  SECTION     • CHOLECYSTECTOMY     • ECHO - CONVERTED  2015   • HYSTERECTOMY       Family History   Problem Relation Age of Onset   • Leukemia Mother    • Hyperlipidemia Father    • Heart attack Father    • Heart attack Brother    • Cancer Brother         Lung / Bladder     Past Medical History:   Diagnosis Date   • Arthritis    • Asthma    • Bradycardia    • Chronic anticoagulation    • Diabetes mellitus (CMS/HCC)     Type 2   • H/O multiple allergies    • Hyperlipidemia    • Hypertension    • Hypertensive heart disease    • Hypothyroidism    • Iron deficiency    • Paroxysmal atrial fibrillation (CMS/HCC)    • Shingles      Patient Active Problem List   Diagnosis   • Hypertension*   • Paroxysmal atrial fibrillation (CMS/HCC)   • Asthma*   • Iron deficiency*   • Hypothyroidism*   • Diabetes mellitus*   • Arthritis*   • Chronic anticoagulation*   • Hyperlipidemia*   • Nonrheumatic aortic valve stenosis    • Pulmonary hypertension (CMS/HCC)   • Chronic fatigue       Social History   Substance Use Topics   • Smoking status: Former Smoker     Quit date: 5/23/1966   • Smokeless tobacco: Never Used   • Alcohol use No       Allergies   Allergen Reactions   • Ibuprofen Other (See Comments)     Patient states that it knots her stomach    • Sulfa Antibiotics Hives       Current Outpatient Prescriptions on File Prior to Visit   Medication Sig   • albuterol (PROVENTIL HFA;VENTOLIN HFA) 108 (90 BASE) MCG/ACT inhaler Inhale 2 puffs Every 4 (Four) Hours As Needed for wheezing.   • apixaban (ELIQUIS) 5 MG tablet tablet Take 5 mg by mouth 2 (two) times a day.   • busPIRone (BUSPAR) 5 MG tablet Take 5 mg by mouth 3 (three) times a day.   • cloNIDine (CATAPRES) 0.2 MG tablet Take 0.2 mg by mouth 3 (three) times a day as needed for high blood pressure.   • coenzyme Q10 100 MG capsule Take 100 mg by mouth Daily.   • FLUTICASONE PROPIONATE NA into each nostril.   • furosemide (LASIX) 20 MG tablet TK 1 T PO QD PRN   • hydrALAZINE (APRESOLINE) 100 MG tablet Take 100 mg by mouth 3 (three) times a day. 1/2 tablet three times daily   • insulin aspart (NovoLOG) 100 UNIT/ML injection Inject  under the skin 3 (Three) Times a Day Before Meals. Sliding scale   • insulin detemir (LEVEMIR) 100 UNIT/ML injection Inject  under the skin Daily. 25 units in the morning, 20 units in the evening.   • levothyroxine (SYNTHROID, LEVOTHROID) 125 MCG tablet Take 125 mcg by mouth daily.   • Linaclotide (LINZESS) 145 MCG capsule Take 145 capsules by mouth daily.   • loratadine (CLARITIN) 10 MG tablet Take 10 mg by mouth daily.   • omeprazole (PriLOSEC) 20 MG capsule Take 20 mg by mouth 2 (two) times a day.   • polyethylene glycol (MIRALAX) packet Take 17 g by mouth Daily.   • potassium chloride (K-DUR,KLOR-CON) 10 MEQ CR tablet TK 1 T PO QD PRN   • rosuvastatin (CRESTOR) 10 MG tablet Take 10 mg by mouth daily.   • vitamin B-12 (CYANOCOBALAMIN) 500 MCG tablet  "Take 500 mcg by mouth daily.   • [DISCONTINUED] chlorthalidone (HYGROTEN) 25 MG tablet Take 25 mg by mouth daily.   • [DISCONTINUED] baclofen (LIORESAL) 10 MG tablet Take 10 mg by mouth daily.   • [DISCONTINUED] calcium citrate-vitamin d (CITRACAL) 200-250 MG-UNIT tablet tablet Take  by mouth Daily.   • [DISCONTINUED] montelukast (SINGULAIR) 10 MG tablet Take 10 mg by mouth every night.     No current facility-administered medications on file prior to visit.          The following portions of the patient's history were reviewed and updated as appropriate: allergies, current medications, past family history, past medical history, past social history, past surgical history and problem list.    Review of Systems   Constitution: Positive for weakness and malaise/fatigue.   HENT: Negative.  Negative for congestion.    Eyes: Negative.    Cardiovascular: Negative.  Negative for chest pain, cyanosis, dyspnea on exertion, irregular heartbeat, leg swelling, near-syncope, orthopnea, palpitations, paroxysmal nocturnal dyspnea and syncope.   Respiratory: Negative.  Negative for shortness of breath.    Hematologic/Lymphatic: Negative.    Musculoskeletal: Negative.    Gastrointestinal: Negative.    Neurological: Negative for headaches.          Objective:     /60   Pulse 72   Ht 157.5 cm (62\")   Wt 89.4 kg (197 lb)   SpO2 98%   BMI 36.03 kg/m²   Physical Exam   Constitutional: She appears well-developed and well-nourished. No distress.   HENT:   Head: Normocephalic and atraumatic.   Mouth/Throat: Oropharynx is clear and moist. No oropharyngeal exudate.   Eyes: Pupils are equal, round, and reactive to light. Conjunctivae and EOM are normal. No scleral icterus.   Neck: Normal range of motion. Neck supple. No JVD present. No tracheal deviation present. No thyromegaly present.   Cardiovascular: Normal rate, regular rhythm, normal heart sounds and intact distal pulses.  PMI is not displaced.  Exam reveals no gallop, no " friction rub and no decreased pulses.    No murmur heard.  Pulses:       Carotid pulses are 3+ on the right side, and 3+ on the left side.       Radial pulses are 3+ on the right side, and 3+ on the left side.   Pulmonary/Chest: Effort normal and breath sounds normal. No respiratory distress. She has no wheezes. She has no rales. She exhibits no tenderness.   Abdominal: Soft. Bowel sounds are normal. She exhibits no distension, no abdominal bruit and no mass. There is no splenomegaly or hepatomegaly. There is no tenderness. There is no rebound and no guarding.   Musculoskeletal: Normal range of motion. She exhibits no edema, tenderness or deformity.   Lymphadenopathy:     She has no cervical adenopathy.   Neurological: She is alert. She has normal reflexes. No cranial nerve deficit. She exhibits normal muscle tone. Coordination normal.   Skin: Skin is warm and dry. No rash noted. She is not diaphoretic. No erythema.   Psychiatric: She has a normal mood and affect. Her behavior is normal. Judgment and thought content normal.         Lab Review  Lab Results   Component Value Date     02/17/2017    K 4.3 02/17/2017     02/17/2017    BUN 26 (H) 02/17/2017    CREATININE 1.14 02/17/2017    GLUCOSE 118 (H) 02/17/2017    CALCIUM 9.3 02/17/2017    ALT 18 02/17/2017    ALKPHOS 105 (H) 02/17/2017    LABIL2 1.4 (L) 03/24/2016     Lab Results   Component Value Date    CKTOTAL 108 03/24/2016     Lab Results   Component Value Date    WBC 7.3 03/24/2016    HGB 12.8 03/24/2016    HCT 40.1 03/24/2016     03/24/2016     No results found for: INR  No results found for: MG  Lab Results   Component Value Date    TSH 0.612 03/30/2015     No results found for: BNP  Lab Results   Component Value Date    CHLPL 99 (L) 02/17/2017    TRIG 104 02/17/2017    HDL 49 (L) 03/24/2016    VLDL 15 03/24/2016     Lab Results   Component Value Date    LDL 33 03/24/2016    LDL 44 04/23/2014         ECG 12 Lead  Date/Time: 10/9/2018 4:17  PM  Performed by: ROLLY DE LA TORRE  Authorized by: ROLLY DE LA TORRE   Rhythm: sinus rhythm  Rate: normal  BPM: 68  Conduction: conduction normal  QRS axis: normal  Other findings: LAE  Clinical impression: myocardial infarction  Comments: Possible old septal MI               I personally viewed and interpreted the patient's LAB data         Assessment:     1. Chronic fatigue    2. Palpitations    3. Paroxysmal atrial fibrillation (CMS/HCC)    4. Essential hypertension    5. Chronic anticoagulation*          Plan:      Patient complains of marked fatigue and weakness.  Her blood pressure is running low.  Patient was advised to DC Hygroton she already is taking Lasix.  She will continue rest of her medications.  EKG shows sinus rhythm with no acute changes.  She will continue anticoagulation with eliquis.  Follow-up scheduled        Return in about 3 months (around 1/9/2019).

## 2019-01-08 ENCOUNTER — OFFICE VISIT (OUTPATIENT)
Dept: CARDIOLOGY | Facility: CLINIC | Age: 79
End: 2019-01-08

## 2019-01-08 VITALS
SYSTOLIC BLOOD PRESSURE: 146 MMHG | BODY MASS INDEX: 36.25 KG/M2 | DIASTOLIC BLOOD PRESSURE: 70 MMHG | HEART RATE: 71 BPM | OXYGEN SATURATION: 95 % | HEIGHT: 62 IN | WEIGHT: 197 LBS

## 2019-01-08 DIAGNOSIS — Z79.01 CHRONIC ANTICOAGULATION: ICD-10-CM

## 2019-01-08 DIAGNOSIS — I35.0 NONRHEUMATIC AORTIC VALVE STENOSIS: ICD-10-CM

## 2019-01-08 DIAGNOSIS — I48.0 PAROXYSMAL ATRIAL FIBRILLATION (HCC): ICD-10-CM

## 2019-01-08 DIAGNOSIS — R07.9 CHEST PAIN IN ADULT: Primary | ICD-10-CM

## 2019-01-08 DIAGNOSIS — I10 ESSENTIAL HYPERTENSION: ICD-10-CM

## 2019-01-08 DIAGNOSIS — E78.2 MIXED HYPERLIPIDEMIA: ICD-10-CM

## 2019-01-08 PROCEDURE — 99214 OFFICE O/P EST MOD 30 MIN: CPT | Performed by: INTERNAL MEDICINE

## 2019-01-08 RX ORDER — PANTOPRAZOLE SODIUM 40 MG/1
TABLET, DELAYED RELEASE ORAL
Refills: 5 | COMMUNITY
Start: 2018-11-12 | End: 2019-03-08 | Stop reason: ALTCHOICE

## 2019-01-08 NOTE — PROGRESS NOTES
subjective     Chief Complaint   Patient presents with   • Atrial Fibrillation   • Follow-up     History of Present Illness  Patient is 79 years old white female who has history of 40 paroxysmal atrial fibrillation.  She is in sinus rhythm.  She is seeing Dr. Mccracken in [place].  No report is available from Dr. Mccracken.  Patient is not sure what medication she is taking.  She does not think she is taking any antiarrhythmic medications she was taking Tikosyn in the past.  She is anticoagulated with eliquis which she is taking regularly.    Patient has history of hypertension.  Patient is again not sure what medication she is taking.  She thinks that she is taking hydralazine 100 mg half tablet 4 times a day.  She is not sure about Toprol or clonidine.  She was supposed be taking clonidine only on when necessary basis and was supposed be taking Toprol regularly    Other problems consist of insulin the  diabetes mellitus, hypothyroidism and aortic stenosis.    Patient now complains of some midsternal chest pain off and on.  Last echo and stress test was done in [place].  Patient was advised to have the studies done in [place] however she wishes to have it done in [place].    Past Surgical History:   Procedure Laterality Date   • APPENDECTOMY     • CARDIAC CATHETERIZATION     • CARDIOVASCULAR STRESS TEST  2012   •  SECTION     • CHOLECYSTECTOMY     • ECHO - CONVERTED  2015   • HYSTERECTOMY       Family History   Problem Relation Age of Onset   • Leukemia Mother    • Hyperlipidemia Father    • Heart attack Father    • Heart attack Brother    • Cancer Brother         Lung / Bladder     Past Medical History:   Diagnosis Date   • Arthritis    • Asthma    • Bradycardia    • Chronic anticoagulation    • Chronic kidney disease    • Diabetes mellitus (CMS/HCC)     Type 2   • H/O multiple allergies    • Heart murmur    • Hyperlipidemia    • Hypertension    • Hypertensive heart disease    • Hypothyroidism    • Iron  deficiency    • Paroxysmal atrial fibrillation (CMS/HCC)    • Shingles    • Valvular disease      Patient Active Problem List   Diagnosis   • Hypertension*   • Paroxysmal atrial fibrillation (CMS/HCC)   • Asthma*   • Iron deficiency*   • Hypothyroidism*   • Diabetes mellitus*   • Arthritis*   • Chronic anticoagulation*   • Hyperlipidemia*   • Nonrheumatic aortic valve stenosis   • Pulmonary hypertension (CMS/HCC)   • Chronic fatigue   • Chest pain in adult       Social History     Tobacco Use   • Smoking status: Former Smoker     Last attempt to quit: 1966     Years since quittin.6   • Smokeless tobacco: Never Used   Substance Use Topics   • Alcohol use: No   • Drug use: No       Allergies   Allergen Reactions   • Ibuprofen Other (See Comments)     Patient states that it knots her stomach    • Sulfa Antibiotics Hives       Current Outpatient Medications on File Prior to Visit   Medication Sig   • albuterol (PROVENTIL HFA;VENTOLIN HFA) 108 (90 BASE) MCG/ACT inhaler Inhale 2 puffs Every 4 (Four) Hours As Needed for wheezing.   • apixaban (ELIQUIS) 5 MG tablet tablet Take 5 mg by mouth 2 (two) times a day.   • BD PEN NEEDLE MURIEL U/F 32G X 4 MM misc TO BE USED WITH LEVEMIR   • busPIRone (BUSPAR) 5 MG tablet Take 5 mg by mouth 3 (three) times a day.   • cloNIDine (CATAPRES) 0.2 MG tablet Take 0.2 mg by mouth 3 (three) times a day as needed for high blood pressure.   • coenzyme Q10 100 MG capsule Take 100 mg by mouth Daily.   • dofetilide (TIKOSYN) 250 MCG capsule TK 1 C PO BID   • FLUTICASONE PROPIONATE NA into each nostril.   • furosemide (LASIX) 20 MG tablet TK 1 T PO QD PRN   • gabapentin (NEURONTIN) 600 MG tablet TK 1 T PO  BID   • hydrALAZINE (APRESOLINE) 100 MG tablet Take 100 mg by mouth 3 (three) times a day. 1/2 tablet three times daily   • insulin aspart (NovoLOG) 100 UNIT/ML injection Inject  under the skin 3 (Three) Times a Day Before Meals. Sliding scale   • insulin detemir (LEVEMIR) 100 UNIT/ML  injection Inject  under the skin Daily. 25 units in the morning, 20 units in the evening.   • LEVEMIR FLEXTOUCH 100 UNIT/ML injection INJECT 28 UNITS UNDER THE SKIN QAM AND 20 UNITS QHS   • levothyroxine (SYNTHROID, LEVOTHROID) 125 MCG tablet Take 125 mcg by mouth daily.   • lidocaine (LIDODERM) 5 %    • Linaclotide (LINZESS) 145 MCG capsule Take 145 capsules by mouth daily.   • loratadine (CLARITIN) 10 MG tablet Take 10 mg by mouth daily.   • metoprolol tartrate (LOPRESSOR) 25 MG tablet TK 1 T PO  BID   • nitroglycerin (NITROSTAT) 0.4 MG SL tablet DISSOLVE ASIM MAY REPEAT IN 5 MINS UP TO 3 DOSES IF PAIN PERSISTS CALL 911   • [place] VERIO test strip USE TO TEST QID   • pantoprazole (PROTONIX) 40 MG EC tablet TK 1 T PO BEFORE A MEAL QAM   • polyethylene glycol (MIRALAX) packet Take 17 g by mouth Daily.   • potassium chloride (K-DUR,KLOR-CON) 10 MEQ CR tablet TK 1 T PO QD PRN   • rosuvastatin (CRESTOR) 10 MG tablet Take 10 mg by mouth daily.   • vitamin B-12 (CYANOCOBALAMIN) 500 MCG tablet Take 500 mcg by mouth daily.   • VOLTAREN 1 % gel gel ERICH A SMALL AMOUNT  AA QID   • [DISCONTINUED] omeprazole (PriLOSEC) 20 MG capsule Take 20 mg by mouth 2 (two) times a day.     No current facility-administered medications on file prior to visit.          The following portions of the patient's history were reviewed and updated as appropriate: allergies, current medications, past family history, past medical history, past social history, past surgical history and problem list.    Review of Systems   Constitution: Negative.   HENT: Negative.  Negative for congestion.    Eyes: Negative.    Cardiovascular: Positive for chest pain and dyspnea on exertion. Negative for cyanosis, irregular heartbeat, leg swelling, near-syncope, orthopnea, palpitations, paroxysmal nocturnal dyspnea and syncope.   Respiratory: Positive for shortness of breath.    Hematologic/Lymphatic: Negative.    Musculoskeletal: Negative.    Gastrointestinal:  "Negative.    Neurological: Negative.  Negative for headaches.          Objective:     /70   Pulse 71   Ht 157.5 cm (62\")   Wt 89.4 kg (197 lb)   SpO2 95%   BMI 36.03 kg/m²   Physical Exam   Constitutional: She appears well-developed and well-nourished. No distress.   HENT:   Head: Normocephalic and atraumatic.   Mouth/Throat: Oropharynx is clear and moist. No oropharyngeal exudate.   Eyes: Conjunctivae and EOM are normal. Pupils are equal, round, and reactive to light. No scleral icterus.   Neck: Normal range of motion. Neck supple. No JVD present. No tracheal deviation present. No thyromegaly present.   Cardiovascular: Normal rate, regular rhythm, normal heart sounds and intact distal pulses. PMI is not displaced. Exam reveals no gallop, no friction rub and no decreased pulses.   No murmur heard.  Pulses:       Carotid pulses are 3+ on the right side, and 3+ on the left side.       Radial pulses are 3+ on the right side, and 3+ on the left side.   Pulmonary/Chest: Effort normal and breath sounds normal. No respiratory distress. She has no wheezes. She has no rales. She exhibits no tenderness.   Abdominal: Soft. Bowel sounds are normal. She exhibits no distension, no abdominal bruit and no mass. There is no splenomegaly or hepatomegaly. There is no tenderness. There is no rebound and no guarding.   Musculoskeletal: Normal range of motion. She exhibits no edema, tenderness or deformity.   Lymphadenopathy:     She has no cervical adenopathy.   Neurological: She is alert. She has normal reflexes. No cranial nerve deficit. She exhibits normal muscle tone. Coordination normal.   Skin: Skin is warm and dry. No rash noted. She is not diaphoretic. No erythema.   Psychiatric: She has a normal mood and affect. Her behavior is normal. Judgment and thought content normal.         Lab Review    Procedures       I personally viewed and interpreted the patient's LAB data         Assessment:     1. Chest pain in adult  "   2. Paroxysmal atrial fibrillation (CMS/HCC)    3. Nonrheumatic aortic valve stenosis    4. Essential hypertension    5. Mixed hyperlipidemia    6. Chronic anticoagulation*          Plan:      Patient is 79 years old white female who is here for cardiology follow-up.  Patient complains of chest pain.  His stress test was scheduled.  She also has history of aortic stenosis will check echocardiogram for assessment of gradient at aortic level and also to check for pulmonary hypertension follow-up.    Blood pressure is mildly elevated today.  Patient is not sure what medication she is taking.  She was advised to call back with all the medication that she is taking and she will bring medications with her at every visit.  She was advised to continue eliquis 5 twice a day also.    Patient has hyperlipidemia and is taking Crestor which will be continued.    Follow-up scheduled        Return in about 2 months (around 3/8/2019).

## 2019-03-08 ENCOUNTER — OFFICE VISIT (OUTPATIENT)
Dept: CARDIOLOGY | Facility: CLINIC | Age: 79
End: 2019-03-08

## 2019-03-08 VITALS
SYSTOLIC BLOOD PRESSURE: 146 MMHG | DIASTOLIC BLOOD PRESSURE: 76 MMHG | OXYGEN SATURATION: 96 % | WEIGHT: 195 LBS | HEIGHT: 62 IN | HEART RATE: 75 BPM | BODY MASS INDEX: 35.88 KG/M2

## 2019-03-08 DIAGNOSIS — I35.0 NONRHEUMATIC AORTIC VALVE STENOSIS: ICD-10-CM

## 2019-03-08 DIAGNOSIS — I48.0 PAROXYSMAL ATRIAL FIBRILLATION (HCC): Primary | ICD-10-CM

## 2019-03-08 DIAGNOSIS — Z79.01 CHRONIC ANTICOAGULATION: ICD-10-CM

## 2019-03-08 DIAGNOSIS — I10 ESSENTIAL HYPERTENSION: ICD-10-CM

## 2019-03-08 PROCEDURE — 99213 OFFICE O/P EST LOW 20 MIN: CPT | Performed by: INTERNAL MEDICINE

## 2019-03-08 RX ORDER — OMEPRAZOLE 20 MG/1
CAPSULE, DELAYED RELEASE ORAL
Refills: 0 | COMMUNITY
Start: 2019-01-10

## 2019-03-08 RX ORDER — BACLOFEN 10 MG/1
TABLET ORAL DAILY
Refills: 0 | COMMUNITY
Start: 2019-02-27

## 2019-03-08 RX ORDER — ACETAMINOPHEN AND CODEINE PHOSPHATE 300; 30 MG/1; MG/1
TABLET ORAL
Refills: 0 | COMMUNITY
Start: 2019-01-27

## 2019-05-08 ENCOUNTER — LAB REQUISITION (OUTPATIENT)
Dept: LAB | Facility: HOSPITAL | Age: 79
End: 2019-05-08

## 2019-05-08 DIAGNOSIS — I27.20 PULMONARY HYPERTENSION (HCC): ICD-10-CM

## 2019-05-08 DIAGNOSIS — E11.9 TYPE 2 DIABETES MELLITUS WITHOUT COMPLICATIONS (HCC): ICD-10-CM

## 2019-05-08 DIAGNOSIS — N18.30 CHRONIC KIDNEY DISEASE, STAGE III (MODERATE) (HCC): ICD-10-CM

## 2019-05-08 LAB
ANION GAP SERPL CALCULATED.3IONS-SCNC: 14.2 MMOL/L
BUN BLD-MCNC: 22 MG/DL (ref 8–23)
BUN/CREAT SERPL: 11.3 (ref 7–25)
CALCIUM SPEC-SCNC: 8.4 MG/DL (ref 8.6–10.5)
CHLORIDE SERPL-SCNC: 99 MMOL/L (ref 98–107)
CO2 SERPL-SCNC: 22.8 MMOL/L (ref 22–29)
CREAT BLD-MCNC: 1.95 MG/DL (ref 0.57–1)
GFR SERPL CREATININE-BSD FRML MDRD: 25 ML/MIN/1.73
GLUCOSE BLD-MCNC: 189 MG/DL (ref 65–99)
HBA1C MFR BLD: 6.8 % (ref 4.8–5.6)
POTASSIUM BLD-SCNC: 4.4 MMOL/L (ref 3.5–5.2)
SODIUM BLD-SCNC: 136 MMOL/L (ref 136–145)

## 2019-05-08 PROCEDURE — 80048 BASIC METABOLIC PNL TOTAL CA: CPT | Performed by: CLINIC/CENTER

## 2019-05-08 PROCEDURE — 83036 HEMOGLOBIN GLYCOSYLATED A1C: CPT | Performed by: CLINIC/CENTER

## 2019-05-20 ENCOUNTER — LAB REQUISITION (OUTPATIENT)
Dept: LAB | Facility: HOSPITAL | Age: 79
End: 2019-05-20

## 2019-05-20 DIAGNOSIS — N39.0 URINARY TRACT INFECTION: ICD-10-CM

## 2019-05-20 LAB
BACTERIA UR QL AUTO: NORMAL /HPF
BILIRUB UR QL STRIP: ABNORMAL
CLARITY UR: CLEAR
COLOR UR: ABNORMAL
GLUCOSE UR STRIP-MCNC: ABNORMAL MG/DL
HGB UR QL STRIP.AUTO: NEGATIVE
HYALINE CASTS UR QL AUTO: NORMAL /LPF
KETONES UR QL STRIP: ABNORMAL
LEUKOCYTE ESTERASE UR QL STRIP.AUTO: NEGATIVE
NITRITE UR QL STRIP: NEGATIVE
PH UR STRIP.AUTO: 6 [PH] (ref 5–8)
PROT UR QL STRIP: ABNORMAL
RBC # UR: NORMAL /HPF
REF LAB TEST METHOD: NORMAL
SP GR UR STRIP: 1.03 (ref 1–1.03)
SQUAMOUS #/AREA URNS HPF: NORMAL /HPF
UROBILINOGEN UR QL STRIP: ABNORMAL
WBC UR QL AUTO: NORMAL /HPF

## 2019-05-20 PROCEDURE — 81001 URINALYSIS AUTO W/SCOPE: CPT | Performed by: CLINIC/CENTER

## 2019-05-20 PROCEDURE — 87086 URINE CULTURE/COLONY COUNT: CPT | Performed by: CLINIC/CENTER

## 2019-05-21 LAB — BACTERIA SPEC AEROBE CULT: NO GROWTH

## 2019-05-29 ENCOUNTER — LAB REQUISITION (OUTPATIENT)
Dept: LAB | Facility: HOSPITAL | Age: 79
End: 2019-05-29

## 2019-05-29 DIAGNOSIS — N18.30 CHRONIC KIDNEY DISEASE, STAGE III (MODERATE) (HCC): ICD-10-CM

## 2019-05-29 DIAGNOSIS — E11.9 TYPE 2 DIABETES MELLITUS WITHOUT COMPLICATIONS (HCC): ICD-10-CM

## 2019-05-29 LAB
ANION GAP SERPL CALCULATED.3IONS-SCNC: 15 MMOL/L
BASOPHILS # BLD AUTO: 0.05 10*3/MM3 (ref 0–0.2)
BASOPHILS NFR BLD AUTO: 0.8 % (ref 0–1.5)
BUN BLD-MCNC: 23 MG/DL (ref 8–23)
BUN/CREAT SERPL: 14.4 (ref 7–25)
CALCIUM SPEC-SCNC: 8.5 MG/DL (ref 8.6–10.5)
CHLORIDE SERPL-SCNC: 98 MMOL/L (ref 98–107)
CO2 SERPL-SCNC: 22 MMOL/L (ref 22–29)
CREAT BLD-MCNC: 1.6 MG/DL (ref 0.57–1)
DEPRECATED RDW RBC AUTO: 46.2 FL (ref 37–54)
EOSINOPHIL # BLD AUTO: 0.07 10*3/MM3 (ref 0–0.4)
EOSINOPHIL NFR BLD AUTO: 1.1 % (ref 0.3–6.2)
ERYTHROCYTE [DISTWIDTH] IN BLOOD BY AUTOMATED COUNT: 14.2 % (ref 12.3–15.4)
GFR SERPL CREATININE-BSD FRML MDRD: 31 ML/MIN/1.73
GLUCOSE BLD-MCNC: 204 MG/DL (ref 65–99)
HCT VFR BLD AUTO: 39.9 % (ref 34–46.6)
HGB BLD-MCNC: 13 G/DL (ref 12–15.9)
IMM GRANULOCYTES # BLD AUTO: 0.02 10*3/MM3 (ref 0–0.05)
IMM GRANULOCYTES NFR BLD AUTO: 0.3 % (ref 0–0.5)
LYMPHOCYTES # BLD AUTO: 1.77 10*3/MM3 (ref 0.7–3.1)
LYMPHOCYTES NFR BLD AUTO: 28.6 % (ref 19.6–45.3)
MCH RBC QN AUTO: 29.5 PG (ref 26.6–33)
MCHC RBC AUTO-ENTMCNC: 32.6 G/DL (ref 31.5–35.7)
MCV RBC AUTO: 90.7 FL (ref 79–97)
MONOCYTES # BLD AUTO: 0.71 10*3/MM3 (ref 0.1–0.9)
MONOCYTES NFR BLD AUTO: 11.5 % (ref 5–12)
NEUTROPHILS # BLD AUTO: 3.57 10*3/MM3 (ref 1.7–7)
NEUTROPHILS NFR BLD AUTO: 57.7 % (ref 42.7–76)
PLATELET # BLD AUTO: 188 10*3/MM3 (ref 140–450)
PMV BLD AUTO: 9.9 FL (ref 6–12)
POTASSIUM BLD-SCNC: 3.7 MMOL/L (ref 3.5–5.2)
RBC # BLD AUTO: 4.4 10*6/MM3 (ref 3.77–5.28)
SODIUM BLD-SCNC: 135 MMOL/L (ref 136–145)
WBC NRBC COR # BLD: 6.19 10*3/MM3 (ref 3.4–10.8)

## 2019-05-29 PROCEDURE — 85025 COMPLETE CBC W/AUTO DIFF WBC: CPT | Performed by: CLINIC/CENTER

## 2019-05-29 PROCEDURE — 80048 BASIC METABOLIC PNL TOTAL CA: CPT | Performed by: CLINIC/CENTER

## 2019-06-03 ENCOUNTER — LAB REQUISITION (OUTPATIENT)
Dept: LAB | Facility: HOSPITAL | Age: 79
End: 2019-06-03

## 2019-06-03 DIAGNOSIS — N18.30 CHRONIC KIDNEY DISEASE, STAGE III (MODERATE) (HCC): ICD-10-CM

## 2019-06-03 DIAGNOSIS — I35.0 NONRHEUMATIC AORTIC VALVE STENOSIS: ICD-10-CM

## 2019-06-03 DIAGNOSIS — I27.20 PULMONARY HYPERTENSION (HCC): ICD-10-CM

## 2019-06-03 LAB
ALBUMIN SERPL-MCNC: 2.77 G/DL (ref 3.5–5.2)
ALBUMIN/GLOB SERPL: 0.8 G/DL
ALP SERPL-CCNC: 79 U/L (ref 39–117)
ALT SERPL W P-5'-P-CCNC: 18 U/L (ref 1–33)
ANION GAP SERPL CALCULATED.3IONS-SCNC: 12.4 MMOL/L
AST SERPL-CCNC: 46 U/L (ref 1–32)
BILIRUB SERPL-MCNC: 0.5 MG/DL (ref 0.2–1.2)
BUN BLD-MCNC: 23 MG/DL (ref 8–23)
BUN/CREAT SERPL: 14.8 (ref 7–25)
CALCIUM SPEC-SCNC: 8.8 MG/DL (ref 8.6–10.5)
CHLORIDE SERPL-SCNC: 102 MMOL/L (ref 98–107)
CO2 SERPL-SCNC: 20.6 MMOL/L (ref 22–29)
CREAT BLD-MCNC: 1.55 MG/DL (ref 0.57–1)
GFR SERPL CREATININE-BSD FRML MDRD: 32 ML/MIN/1.73
GLOBULIN UR ELPH-MCNC: 3.5 GM/DL
GLUCOSE BLD-MCNC: 83 MG/DL (ref 65–99)
POTASSIUM BLD-SCNC: 5.5 MMOL/L (ref 3.5–5.2)
PROT SERPL-MCNC: 6.3 G/DL (ref 6–8.5)
SODIUM BLD-SCNC: 135 MMOL/L (ref 136–145)

## 2019-06-03 PROCEDURE — 80053 COMPREHEN METABOLIC PANEL: CPT | Performed by: CLINIC/CENTER

## 2019-06-07 ENCOUNTER — LAB REQUISITION (OUTPATIENT)
Dept: LAB | Facility: HOSPITAL | Age: 79
End: 2019-06-07

## 2019-06-07 DIAGNOSIS — E87.5 HYPERKALEMIA: ICD-10-CM

## 2019-06-07 LAB — POTASSIUM BLD-SCNC: 3.2 MMOL/L (ref 3.5–5.2)

## 2019-06-07 PROCEDURE — 84132 ASSAY OF SERUM POTASSIUM: CPT | Performed by: CLINIC/CENTER

## 2019-06-11 ENCOUNTER — LAB REQUISITION (OUTPATIENT)
Dept: LAB | Facility: HOSPITAL | Age: 79
End: 2019-06-11

## 2019-06-11 DIAGNOSIS — I27.20 PULMONARY HYPERTENSION (HCC): ICD-10-CM

## 2019-06-11 DIAGNOSIS — E11.9 TYPE 2 DIABETES MELLITUS WITHOUT COMPLICATIONS (HCC): ICD-10-CM

## 2019-06-11 LAB
ANION GAP SERPL CALCULATED.3IONS-SCNC: 10.8 MMOL/L
BUN BLD-MCNC: 21 MG/DL (ref 8–23)
BUN/CREAT SERPL: 12.4 (ref 7–25)
CALCIUM SPEC-SCNC: 8.5 MG/DL (ref 8.6–10.5)
CHLORIDE SERPL-SCNC: 103 MMOL/L (ref 98–107)
CO2 SERPL-SCNC: 25.2 MMOL/L (ref 22–29)
CREAT BLD-MCNC: 1.7 MG/DL (ref 0.57–1)
GFR SERPL CREATININE-BSD FRML MDRD: 29 ML/MIN/1.73
GLUCOSE BLD-MCNC: 116 MG/DL (ref 65–99)
POTASSIUM BLD-SCNC: 3.5 MMOL/L (ref 3.5–5.2)
SODIUM BLD-SCNC: 139 MMOL/L (ref 136–145)

## 2019-06-11 PROCEDURE — 80048 BASIC METABOLIC PNL TOTAL CA: CPT | Performed by: CLINIC/CENTER

## 2019-07-08 ENCOUNTER — LAB REQUISITION (OUTPATIENT)
Dept: LAB | Facility: HOSPITAL | Age: 79
End: 2019-07-08

## 2019-07-08 LAB
ANION GAP SERPL CALCULATED.3IONS-SCNC: 12 MMOL/L (ref 5–15)
BACTERIA UR QL AUTO: ABNORMAL /HPF
BASOPHILS # BLD AUTO: 0.06 10*3/MM3 (ref 0–0.2)
BASOPHILS NFR BLD AUTO: 0.9 % (ref 0–1.5)
BILIRUB UR QL STRIP: NEGATIVE
BUN BLD-MCNC: 22 MG/DL (ref 8–23)
BUN/CREAT SERPL: 13.8 (ref 7–25)
CALCIUM SPEC-SCNC: 8.6 MG/DL (ref 8.6–10.5)
CHLORIDE SERPL-SCNC: 101 MMOL/L (ref 98–107)
CLARITY UR: CLEAR
CO2 SERPL-SCNC: 25 MMOL/L (ref 22–29)
COLOR UR: YELLOW
CREAT BLD-MCNC: 1.59 MG/DL (ref 0.57–1)
DEPRECATED RDW RBC AUTO: 47.3 FL (ref 37–54)
EOSINOPHIL # BLD AUTO: 0.1 10*3/MM3 (ref 0–0.4)
EOSINOPHIL NFR BLD AUTO: 1.5 % (ref 0.3–6.2)
ERYTHROCYTE [DISTWIDTH] IN BLOOD BY AUTOMATED COUNT: 14.5 % (ref 12.3–15.4)
FERRITIN SERPL-MCNC: 146.6 NG/ML (ref 13–150)
GFR SERPL CREATININE-BSD FRML MDRD: 31 ML/MIN/1.73
GLUCOSE BLD-MCNC: 208 MG/DL (ref 65–99)
GLUCOSE UR STRIP-MCNC: ABNORMAL MG/DL
HCT VFR BLD AUTO: 39 % (ref 34–46.6)
HGB BLD-MCNC: 12.6 G/DL (ref 12–15.9)
HGB UR QL STRIP.AUTO: NEGATIVE
HYALINE CASTS UR QL AUTO: ABNORMAL /LPF
IMM GRANULOCYTES # BLD AUTO: 0.04 10*3/MM3 (ref 0–0.05)
IMM GRANULOCYTES NFR BLD AUTO: 0.6 % (ref 0–0.5)
IRON 24H UR-MRATE: 71 MCG/DL (ref 37–145)
KETONES UR QL STRIP: NEGATIVE
LEUKOCYTE ESTERASE UR QL STRIP.AUTO: NEGATIVE
LYMPHOCYTES # BLD AUTO: 1.35 10*3/MM3 (ref 0.7–3.1)
LYMPHOCYTES NFR BLD AUTO: 20.2 % (ref 19.6–45.3)
MCH RBC QN AUTO: 29.9 PG (ref 26.6–33)
MCHC RBC AUTO-ENTMCNC: 32.3 G/DL (ref 31.5–35.7)
MCV RBC AUTO: 92.6 FL (ref 79–97)
MONOCYTES # BLD AUTO: 0.54 10*3/MM3 (ref 0.1–0.9)
MONOCYTES NFR BLD AUTO: 8.1 % (ref 5–12)
NEUTROPHILS # BLD AUTO: 4.58 10*3/MM3 (ref 1.7–7)
NEUTROPHILS NFR BLD AUTO: 68.7 % (ref 42.7–76)
NITRITE UR QL STRIP: NEGATIVE
PH UR STRIP.AUTO: 6.5 [PH] (ref 5–8)
PLATELET # BLD AUTO: 161 10*3/MM3 (ref 140–450)
PMV BLD AUTO: 10.3 FL (ref 6–12)
POTASSIUM BLD-SCNC: 3.4 MMOL/L (ref 3.5–5.2)
PROT UR QL STRIP: ABNORMAL
RBC # BLD AUTO: 4.21 10*6/MM3 (ref 3.77–5.28)
RBC # UR: ABNORMAL /HPF
REF LAB TEST METHOD: ABNORMAL
SODIUM BLD-SCNC: 138 MMOL/L (ref 136–145)
SP GR UR STRIP: 1.02 (ref 1–1.03)
SQUAMOUS #/AREA URNS HPF: ABNORMAL /HPF
UROBILINOGEN UR QL STRIP: ABNORMAL
WBC NRBC COR # BLD: 6.67 10*3/MM3 (ref 3.4–10.8)
WBC UR QL AUTO: ABNORMAL /HPF

## 2019-07-08 PROCEDURE — 83540 ASSAY OF IRON: CPT | Performed by: CLINIC/CENTER

## 2019-07-08 PROCEDURE — 85025 COMPLETE CBC W/AUTO DIFF WBC: CPT | Performed by: CLINIC/CENTER

## 2019-07-08 PROCEDURE — 82570 ASSAY OF URINE CREATININE: CPT | Performed by: CLINIC/CENTER

## 2019-07-08 PROCEDURE — 82043 UR ALBUMIN QUANTITATIVE: CPT | Performed by: CLINIC/CENTER

## 2019-07-08 PROCEDURE — 82728 ASSAY OF FERRITIN: CPT | Performed by: CLINIC/CENTER

## 2019-07-08 PROCEDURE — 80048 BASIC METABOLIC PNL TOTAL CA: CPT | Performed by: CLINIC/CENTER

## 2019-07-08 PROCEDURE — 81001 URINALYSIS AUTO W/SCOPE: CPT | Performed by: CLINIC/CENTER

## 2019-07-09 LAB
ALBUMIN UR-MCNC: 3.7 MG/L
CREAT UR-MCNC: 76.9 MG/DL
MICROALBUMIN/CREAT UR: 48.1 MG/G